# Patient Record
Sex: MALE | Race: WHITE | NOT HISPANIC OR LATINO
[De-identification: names, ages, dates, MRNs, and addresses within clinical notes are randomized per-mention and may not be internally consistent; named-entity substitution may affect disease eponyms.]

---

## 2018-09-25 PROBLEM — Z00.00 ENCOUNTER FOR PREVENTIVE HEALTH EXAMINATION: Status: ACTIVE | Noted: 2018-09-25

## 2018-10-01 ENCOUNTER — APPOINTMENT (OUTPATIENT)
Dept: PULMONOLOGY | Facility: CLINIC | Age: 83
End: 2018-10-01
Payer: MEDICARE

## 2018-10-01 VITALS
OXYGEN SATURATION: 96 % | SYSTOLIC BLOOD PRESSURE: 122 MMHG | HEART RATE: 66 BPM | HEIGHT: 63.5 IN | WEIGHT: 154 LBS | DIASTOLIC BLOOD PRESSURE: 78 MMHG | RESPIRATION RATE: 16 BRPM | BODY MASS INDEX: 26.95 KG/M2

## 2018-10-01 DIAGNOSIS — Z87.438 PERSONAL HISTORY OF OTHER DISEASES OF MALE GENITAL ORGANS: ICD-10-CM

## 2018-10-01 DIAGNOSIS — Z87.891 PERSONAL HISTORY OF NICOTINE DEPENDENCE: ICD-10-CM

## 2018-10-01 DIAGNOSIS — Z86.19 PERSONAL HISTORY OF OTHER INFECTIOUS AND PARASITIC DISEASES: ICD-10-CM

## 2018-10-01 DIAGNOSIS — Z87.448 PERSONAL HISTORY OF OTHER DISEASES OF URINARY SYSTEM: ICD-10-CM

## 2018-10-01 DIAGNOSIS — R06.09 OTHER FORMS OF DYSPNEA: ICD-10-CM

## 2018-10-01 DIAGNOSIS — R93.89 ABNORMAL FINDINGS ON DIAGNOSTIC IMAGING OF OTHER SPECIFIED BODY STRUCTURES: ICD-10-CM

## 2018-10-01 PROCEDURE — 99204 OFFICE O/P NEW MOD 45 MIN: CPT

## 2018-10-01 RX ORDER — PREGABALIN 100 MG/1
100 CAPSULE ORAL
Refills: 0 | Status: ACTIVE | COMMUNITY

## 2018-10-01 RX ORDER — METOPROLOL SUCCINATE 25 MG/1
25 TABLET, EXTENDED RELEASE ORAL
Refills: 0 | Status: ACTIVE | COMMUNITY

## 2018-10-01 RX ORDER — TAMSULOSIN HYDROCHLORIDE 0.4 MG/1
0.4 CAPSULE ORAL
Refills: 0 | Status: ACTIVE | COMMUNITY

## 2018-10-01 RX ORDER — HYDROCHLOROTHIAZIDE 12.5 MG/1
12.5 TABLET ORAL
Refills: 0 | Status: ACTIVE | COMMUNITY

## 2018-10-05 PROBLEM — R93.89 ABNORMAL COMPUTERIZED AXIAL TOMOGRAPHY OF CHEST: Status: ACTIVE | Noted: 2018-10-05

## 2019-06-04 ENCOUNTER — APPOINTMENT (OUTPATIENT)
Dept: DERMATOLOGY | Facility: CLINIC | Age: 84
End: 2019-06-04
Payer: MEDICARE

## 2019-06-04 PROCEDURE — 99202 OFFICE O/P NEW SF 15 MIN: CPT

## 2019-07-02 ENCOUNTER — APPOINTMENT (OUTPATIENT)
Dept: DERMATOLOGY | Facility: CLINIC | Age: 84
End: 2019-07-02

## 2019-12-05 ENCOUNTER — APPOINTMENT (OUTPATIENT)
Dept: DERMATOLOGY | Facility: CLINIC | Age: 84
End: 2019-12-05
Payer: MEDICARE

## 2019-12-05 PROCEDURE — 99215 OFFICE O/P EST HI 40 MIN: CPT

## 2020-02-10 ENCOUNTER — APPOINTMENT (OUTPATIENT)
Dept: OTOLARYNGOLOGY | Facility: CLINIC | Age: 85
End: 2020-02-10

## 2022-12-01 ENCOUNTER — APPOINTMENT (OUTPATIENT)
Dept: DERMATOLOGY | Facility: CLINIC | Age: 87
End: 2022-12-01

## 2023-05-18 ENCOUNTER — APPOINTMENT (OUTPATIENT)
Dept: DERMATOLOGY | Facility: CLINIC | Age: 88
End: 2023-05-18
Payer: MEDICARE

## 2023-05-18 PROCEDURE — 99204 OFFICE O/P NEW MOD 45 MIN: CPT

## 2023-05-22 ENCOUNTER — APPOINTMENT (OUTPATIENT)
Dept: DERMATOLOGY | Facility: CLINIC | Age: 88
End: 2023-05-22

## 2023-07-05 ENCOUNTER — OFFICE (OUTPATIENT)
Dept: URBAN - METROPOLITAN AREA CLINIC 113 | Facility: CLINIC | Age: 88
Setting detail: OPHTHALMOLOGY
End: 2023-07-05
Payer: MEDICARE

## 2023-07-05 DIAGNOSIS — H04.123: ICD-10-CM

## 2023-07-05 DIAGNOSIS — H01.011: ICD-10-CM

## 2023-07-05 DIAGNOSIS — H04.121: ICD-10-CM

## 2023-07-05 DIAGNOSIS — H01.014: ICD-10-CM

## 2023-07-05 DIAGNOSIS — H35.373: ICD-10-CM

## 2023-07-05 DIAGNOSIS — H35.033: ICD-10-CM

## 2023-07-05 DIAGNOSIS — H02.132: ICD-10-CM

## 2023-07-05 DIAGNOSIS — H02.135: ICD-10-CM

## 2023-07-05 DIAGNOSIS — H04.122: ICD-10-CM

## 2023-07-05 PROBLEM — Z96.1 PSEUDOPHAKIA: Status: ACTIVE | Noted: 2023-07-05

## 2023-07-05 PROCEDURE — 92014 COMPRE OPH EXAM EST PT 1/>: CPT | Performed by: OPHTHALMOLOGY

## 2023-07-05 PROCEDURE — 92134 CPTRZ OPH DX IMG PST SGM RTA: CPT | Performed by: OPHTHALMOLOGY

## 2023-07-05 PROCEDURE — 83861 MICROFLUID ANALY TEARS: CPT | Performed by: OPHTHALMOLOGY

## 2023-07-05 ASSESSMENT — CONFRONTATIONAL VISUAL FIELD TEST (CVF)
OS_FINDINGS: FULL
OD_FINDINGS: FULL

## 2023-07-05 ASSESSMENT — LID POSITION - ECTROPION
OD_ECTROPION: RLL 2+
OS_ECTROPION: LLL 2+

## 2023-07-05 ASSESSMENT — REFRACTION_AUTOREFRACTION
OS_CYLINDER: -1.25
OD_SPHERE: +0.50
OD_AXIS: 100
OS_AXIS: 055
OS_SPHERE: +0.25
OD_CYLINDER: -1.75

## 2023-07-05 ASSESSMENT — KERATOMETRY
OD_K1POWER_DIOPTERS: 42.25
OS_K2POWER_DIOPTERS: 43.25
OS_K1POWER_DIOPTERS: 42.25
OD_K2POWER_DIOPTERS: 43.50
OS_AXISANGLE_DEGREES: 112
OD_AXISANGLE_DEGREES: 006

## 2023-07-05 ASSESSMENT — VISUAL ACUITY
OS_BCVA: 20/30
OD_BCVA: 20/20

## 2023-07-05 ASSESSMENT — SPHEQUIV_DERIVED
OD_SPHEQUIV: -0.375
OS_SPHEQUIV: -0.375

## 2023-07-05 ASSESSMENT — TONOMETRY: OS_IOP_MMHG: 11

## 2023-07-05 ASSESSMENT — PUNCTA - ASSESSMENT
OS_PUNCTA: SMALL
OD_PUNCTA: ABSENT

## 2023-07-05 ASSESSMENT — LID EXAM ASSESSMENTS
OS_BLEPHARITIS: 1+
OS_COMMENTS: MILD FLAKING
OD_BLEPHARITIS: 1+
OD_COMMENTS: MILD FLAKING

## 2023-07-05 ASSESSMENT — AXIALLENGTH_DERIVED
OD_AL: 23.9737
OS_AL: 24.0212

## 2024-05-23 ENCOUNTER — INPATIENT (INPATIENT)
Facility: HOSPITAL | Age: 89
LOS: 3 days | Discharge: HOME HEALTH | DRG: 871 | End: 2024-05-27
Attending: GENERAL ACUTE CARE HOSPITAL | Admitting: HOSPITALIST
Payer: MEDICARE

## 2024-05-23 VITALS
WEIGHT: 141.98 LBS | TEMPERATURE: 103 F | OXYGEN SATURATION: 96 % | DIASTOLIC BLOOD PRESSURE: 81 MMHG | RESPIRATION RATE: 20 BRPM | HEART RATE: 98 BPM | SYSTOLIC BLOOD PRESSURE: 169 MMHG

## 2024-05-23 LAB
ALBUMIN SERPL ELPH-MCNC: 4.1 G/DL — SIGNIFICANT CHANGE UP (ref 3.3–5.2)
ALP SERPL-CCNC: 83 U/L — SIGNIFICANT CHANGE UP (ref 40–120)
ALT FLD-CCNC: 17 U/L — SIGNIFICANT CHANGE UP
ANION GAP SERPL CALC-SCNC: 14 MMOL/L — SIGNIFICANT CHANGE UP (ref 5–17)
ANISOCYTOSIS BLD QL: SIGNIFICANT CHANGE UP
APPEARANCE UR: CLEAR — SIGNIFICANT CHANGE UP
APTT BLD: 26.5 SEC — SIGNIFICANT CHANGE UP (ref 24.5–35.6)
AST SERPL-CCNC: 25 U/L — SIGNIFICANT CHANGE UP
BACTERIA # UR AUTO: NEGATIVE /HPF — SIGNIFICANT CHANGE UP
BASOPHILS # BLD AUTO: 0 K/UL — SIGNIFICANT CHANGE UP (ref 0–0.2)
BASOPHILS NFR BLD AUTO: 0 % — SIGNIFICANT CHANGE UP (ref 0–2)
BILIRUB SERPL-MCNC: 2.1 MG/DL — HIGH (ref 0.4–2)
BILIRUB UR-MCNC: NEGATIVE — SIGNIFICANT CHANGE UP
BUN SERPL-MCNC: 35.1 MG/DL — HIGH (ref 8–20)
BURR CELLS BLD QL SMEAR: PRESENT — SIGNIFICANT CHANGE UP
CALCIUM SERPL-MCNC: 9.2 MG/DL — SIGNIFICANT CHANGE UP (ref 8.4–10.5)
CAST: 4 /LPF — SIGNIFICANT CHANGE UP (ref 0–4)
CHLORIDE SERPL-SCNC: 97 MMOL/L — SIGNIFICANT CHANGE UP (ref 96–108)
CO2 SERPL-SCNC: 23 MMOL/L — SIGNIFICANT CHANGE UP (ref 22–29)
COLOR SPEC: YELLOW — SIGNIFICANT CHANGE UP
CREAT SERPL-MCNC: 1.28 MG/DL — SIGNIFICANT CHANGE UP (ref 0.5–1.3)
DIFF PNL FLD: ABNORMAL
EGFR: 52 ML/MIN/1.73M2 — LOW
EOSINOPHIL # BLD AUTO: 0 K/UL — SIGNIFICANT CHANGE UP (ref 0–0.5)
EOSINOPHIL NFR BLD AUTO: 0 % — SIGNIFICANT CHANGE UP (ref 0–6)
FLUAV AG NPH QL: SIGNIFICANT CHANGE UP
FLUBV AG NPH QL: SIGNIFICANT CHANGE UP
GLUCOSE SERPL-MCNC: 132 MG/DL — HIGH (ref 70–99)
GLUCOSE UR QL: NEGATIVE MG/DL — SIGNIFICANT CHANGE UP
HCT VFR BLD CALC: 38.7 % — LOW (ref 39–50)
HGB BLD-MCNC: 13.1 G/DL — SIGNIFICANT CHANGE UP (ref 13–17)
INR BLD: 1.04 RATIO — SIGNIFICANT CHANGE UP (ref 0.85–1.18)
KETONES UR-MCNC: NEGATIVE MG/DL — SIGNIFICANT CHANGE UP
LACTATE BLDV-MCNC: 1.5 MMOL/L — SIGNIFICANT CHANGE UP (ref 0.5–2)
LEUKOCYTE ESTERASE UR-ACNC: NEGATIVE — SIGNIFICANT CHANGE UP
LYMPHOCYTES # BLD AUTO: 0.37 K/UL — LOW (ref 1–3.3)
LYMPHOCYTES # BLD AUTO: 5.2 % — LOW (ref 13–44)
MANUAL SMEAR VERIFICATION: SIGNIFICANT CHANGE UP
MCHC RBC-ENTMCNC: 29.8 PG — SIGNIFICANT CHANGE UP (ref 27–34)
MCHC RBC-ENTMCNC: 33.9 GM/DL — SIGNIFICANT CHANGE UP (ref 32–36)
MCV RBC AUTO: 88 FL — SIGNIFICANT CHANGE UP (ref 80–100)
MICROCYTES BLD QL: SIGNIFICANT CHANGE UP
MONOCYTES # BLD AUTO: 0.43 K/UL — SIGNIFICANT CHANGE UP (ref 0–0.9)
MONOCYTES NFR BLD AUTO: 6.1 % — SIGNIFICANT CHANGE UP (ref 2–14)
NEUTROPHILS # BLD AUTO: 6.27 K/UL — SIGNIFICANT CHANGE UP (ref 1.8–7.4)
NEUTROPHILS NFR BLD AUTO: 88.7 % — HIGH (ref 43–77)
NITRITE UR-MCNC: NEGATIVE — SIGNIFICANT CHANGE UP
PH UR: 6 — SIGNIFICANT CHANGE UP (ref 5–8)
PLAT MORPH BLD: NORMAL — SIGNIFICANT CHANGE UP
PLATELET # BLD AUTO: 115 K/UL — LOW (ref 150–400)
POIKILOCYTOSIS BLD QL AUTO: SLIGHT — SIGNIFICANT CHANGE UP
POLYCHROMASIA BLD QL SMEAR: SLIGHT — SIGNIFICANT CHANGE UP
POTASSIUM SERPL-MCNC: 4.5 MMOL/L — SIGNIFICANT CHANGE UP (ref 3.5–5.3)
POTASSIUM SERPL-SCNC: 4.5 MMOL/L — SIGNIFICANT CHANGE UP (ref 3.5–5.3)
PROT SERPL-MCNC: 6.9 G/DL — SIGNIFICANT CHANGE UP (ref 6.6–8.7)
PROT UR-MCNC: 100 MG/DL
PROTHROM AB SERPL-ACNC: 11.5 SEC — SIGNIFICANT CHANGE UP (ref 9.5–13)
RBC # BLD: 4.4 M/UL — SIGNIFICANT CHANGE UP (ref 4.2–5.8)
RBC # FLD: 14.2 % — SIGNIFICANT CHANGE UP (ref 10.3–14.5)
RBC BLD AUTO: ABNORMAL
RBC CASTS # UR COMP ASSIST: 18 /HPF — HIGH (ref 0–4)
RSV RNA NPH QL NAA+NON-PROBE: SIGNIFICANT CHANGE UP
SARS-COV-2 RNA SPEC QL NAA+PROBE: SIGNIFICANT CHANGE UP
SODIUM SERPL-SCNC: 134 MMOL/L — LOW (ref 135–145)
SP GR SPEC: 1.02 — SIGNIFICANT CHANGE UP (ref 1–1.03)
SQUAMOUS # UR AUTO: 1 /HPF — SIGNIFICANT CHANGE UP (ref 0–5)
TROPONIN T, HIGH SENSITIVITY RESULT: 39 NG/L — SIGNIFICANT CHANGE UP (ref 0–51)
UROBILINOGEN FLD QL: 1 MG/DL — SIGNIFICANT CHANGE UP (ref 0.2–1)
WBC # BLD: 7.07 K/UL — SIGNIFICANT CHANGE UP (ref 3.8–10.5)
WBC # FLD AUTO: 7.07 K/UL — SIGNIFICANT CHANGE UP (ref 3.8–10.5)
WBC UR QL: 1 /HPF — SIGNIFICANT CHANGE UP (ref 0–5)

## 2024-05-23 PROCEDURE — 99285 EMERGENCY DEPT VISIT HI MDM: CPT

## 2024-05-23 PROCEDURE — 71250 CT THORAX DX C-: CPT | Mod: 26,MC

## 2024-05-23 PROCEDURE — 99497 ADVNCD CARE PLAN 30 MIN: CPT | Mod: 25

## 2024-05-23 PROCEDURE — 71045 X-RAY EXAM CHEST 1 VIEW: CPT | Mod: 26

## 2024-05-23 PROCEDURE — 74176 CT ABD & PELVIS W/O CONTRAST: CPT | Mod: 26,MC

## 2024-05-23 PROCEDURE — 99223 1ST HOSP IP/OBS HIGH 75: CPT

## 2024-05-23 PROCEDURE — 93010 ELECTROCARDIOGRAM REPORT: CPT

## 2024-05-23 RX ORDER — VANCOMYCIN HCL 1 G
1000 VIAL (EA) INTRAVENOUS ONCE
Refills: 0 | Status: COMPLETED | OUTPATIENT
Start: 2024-05-23 | End: 2024-05-23

## 2024-05-23 RX ORDER — SODIUM CHLORIDE 9 MG/ML
2000 INJECTION, SOLUTION INTRAVENOUS ONCE
Refills: 0 | Status: COMPLETED | OUTPATIENT
Start: 2024-05-23 | End: 2024-05-23

## 2024-05-23 RX ORDER — CEFEPIME 1 G/1
2000 INJECTION, POWDER, FOR SOLUTION INTRAMUSCULAR; INTRAVENOUS ONCE
Refills: 0 | Status: DISCONTINUED | OUTPATIENT
Start: 2024-05-23 | End: 2024-05-23

## 2024-05-23 RX ORDER — CEFEPIME 1 G/1
2000 INJECTION, POWDER, FOR SOLUTION INTRAMUSCULAR; INTRAVENOUS ONCE
Refills: 0 | Status: COMPLETED | OUTPATIENT
Start: 2024-05-23 | End: 2024-05-23

## 2024-05-23 RX ORDER — AZITHROMYCIN 500 MG/1
500 TABLET, FILM COATED ORAL ONCE
Refills: 0 | Status: COMPLETED | OUTPATIENT
Start: 2024-05-23 | End: 2024-05-23

## 2024-05-23 RX ORDER — ACETAMINOPHEN 500 MG
1000 TABLET ORAL ONCE
Refills: 0 | Status: COMPLETED | OUTPATIENT
Start: 2024-05-23 | End: 2024-05-23

## 2024-05-23 RX ADMIN — Medication 250 MILLIGRAM(S): at 19:00

## 2024-05-23 RX ADMIN — SODIUM CHLORIDE 2000 MILLILITER(S): 9 INJECTION, SOLUTION INTRAVENOUS at 18:53

## 2024-05-23 RX ADMIN — CEFEPIME 2000 MILLIGRAM(S): 1 INJECTION, POWDER, FOR SOLUTION INTRAMUSCULAR; INTRAVENOUS at 18:53

## 2024-05-23 RX ADMIN — Medication 400 MILLIGRAM(S): at 18:53

## 2024-05-23 NOTE — ED ADULT TRIAGE NOTE - CHIEF COMPLAINT QUOTE
Pt BIBA c/o fever.  Only other physical complaint is jaw discomfort from prior surgery to remove cancer. Pt on 4LNC from EMS.

## 2024-05-23 NOTE — ED PROVIDER NOTE - PHYSICAL EXAMINATION
General: NAD  HEENT: Normocephalic, atraumatic  Neck: No apparent stiffness or JVD  Pulm: Chest wall symmetric and nontender, lungs clear to ascultation   Cardiac: Regular rate and regular rhythm  Abdomen: Nontender and nondistended  Skin: Skin is warm, dry and intact without rashes or lesions.  Neuro: No motor or sensory deficits, CN 2-12 intact, PERRLA, EOMI, moving extremities equally  MSK: No deformity or tenderness  Psych: calm, cooperative General: NAD  HEENT: Normocephalic, atraumatic  Neck: No apparent stiffness or JVD  Pulm: Chest wall symmetric and nontender, lungs clear to ascultation, hypoxic on room air  Cardiac: tachycardic rate and regular rhythm  Abdomen: Nontender and nondistended  Skin: Skin is warm, dry and intact without rashes or lesions.  Neuro: No motor or sensory deficits, CN 2-12 intact, PERRLA, EOMI, moving extremities equally  MSK: No deformity or tenderness  Psych: calm, cooperative

## 2024-05-23 NOTE — ED ADULT NURSE REASSESSMENT NOTE - NS ED NURSE REASSESS COMMENT FT1
patient resting on stretcher quietly and on O2 via nc at 2l/min, NSR on cardiac monitor and on iv fluids/antibiotic, no acute distress noted and family at bedside.

## 2024-05-23 NOTE — ED PROVIDER NOTE - OBJECTIVE STATEMENT
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 Patient presents with daughter and denies SOB, cough, chills, burning on urination, diarrhea, constipation, N/V, chest pain, palpitations. Patient endorses right sided jaw pain that is chronic. Daughter reports she has noticed urinary frequency and that he has been coughing more lately. She reports he does not want to be at the hospital and will deny having any symptoms.

## 2024-05-23 NOTE — ED ADULT NURSE NOTE - NSFALLRISKINTERV_ED_ALL_ED

## 2024-05-23 NOTE — ED PROVIDER NOTE - CARE PLAN
Principal Discharge DX:	Pneumonia  Secondary Diagnosis:	Acute respiratory failure with hypoxia  Secondary Diagnosis:	Sepsis   1

## 2024-05-23 NOTE — ED PROVIDER NOTE - CLINICAL SUMMARY MEDICAL DECISION MAKING FREE TEXT BOX
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    Patient febrile to 103.1F and tachycardic. Blood cx, CBC, CMP, UA, Flu with COVID-19 ordered, CXR. Will treat sepsis empirically with cefepime and vanc. IVF. Ofirmev for fever. 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    Patient febrile to 103.1F and tachycardic. Blood cx, CBC, CMP, UA, Flu with COVID-19 ordered, CXR. Will treat sepsis empirically with cefepime and vanc. IVF. Ofirmev for fever.    Patient with hematuria on UA. CXR unremarkable. 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    Patient febrile to 103.1F and tachycardic. Blood cx, CBC, CMP, UA, Flu with COVID-19 ordered, CXR. Will treat sepsis empirically with cefepime and vanc. IVF. Ofirmev for fever.    Patient with hematuria on UA. Upon ambulation, patient desatted to 87%. Placed on nasal cannula. CXR unremarkable. CT chest, abdomen, pelvis ordered

## 2024-05-23 NOTE — ED ADULT NURSE NOTE - OBJECTIVE STATEMENT
Pt is AxOx3, c/o chest pain, fever and chills. Upon assessment pt is febrile, and is on2L nc which he normally isnt on O2. Pt placed in gown. Cardiac monitor and  inplace. Pt is aware of plan of care.

## 2024-05-23 NOTE — ED PROVIDER NOTE - ATTENDING CONTRIBUTION TO CARE
I, Wil Lucero MD, personally saw the patient with the resident, and completed the key components of the history and physical exam. I then discussed the management plan with the resident.  Patient with a history of BPH on Flomax, pacemaker in place, previous bone cancer of the right jaw status post resection is presenting with fever and fatigue.  Per daughter and patient, for the past few days, he has been more fatigued compared to his baseline.  Not active as usual.  Patient has been having cough but denies any shortness of breath or chest pain.  Has been having polyuria but no reported dysuria.  On exam patient is febrile and tachycardic.  While standing up in the room to use the bathroom, he did desaturate to 88% on room air.  Placed on 2 L nasal cannula with improvement.  No abdominal pain noted.  No focal breath sounds heard.  Concern for pneumonia given fever, cough and hypoxia.  Will evaluate for UTI as well given his polyuria.  Possible viral pathology such as flu versus COVID.  Plan on lab work, imaging, nasal cannula oxygen, anticipate admission.

## 2024-05-23 NOTE — ED PROVIDER NOTE - PROGRESS NOTE DETAILS
Patient with hematuria on UA. CXR unremarkable. CT chest, abdomen, pelvis ordered Upon ambulation, patient desatted to 87%. Patient with hematuria on UA. CXR unremarkable. CT chest, abdomen, pelvis ordered Patient with concerning findings of pneumonia on CT scan.  No renal stone noted.  Still on 2 L nasal cannula.  Resting comfortably at this time.  Spoke with Dr. Munoz for admission.  Updated family at bedside.  Wil Lucero MD.

## 2024-05-24 DIAGNOSIS — Z95.0 PRESENCE OF CARDIAC PACEMAKER: Chronic | ICD-10-CM

## 2024-05-24 DIAGNOSIS — J18.9 PNEUMONIA, UNSPECIFIED ORGANISM: ICD-10-CM

## 2024-05-24 LAB
ALBUMIN SERPL ELPH-MCNC: 3.7 G/DL — SIGNIFICANT CHANGE UP (ref 3.3–5.2)
ALP SERPL-CCNC: 90 U/L — SIGNIFICANT CHANGE UP (ref 40–120)
ALT FLD-CCNC: 31 U/L — SIGNIFICANT CHANGE UP
ANION GAP SERPL CALC-SCNC: 14 MMOL/L — SIGNIFICANT CHANGE UP (ref 5–17)
AST SERPL-CCNC: 40 U/L — HIGH
BASOPHILS # BLD AUTO: 0.01 K/UL — SIGNIFICANT CHANGE UP (ref 0–0.2)
BASOPHILS NFR BLD AUTO: 0.1 % — SIGNIFICANT CHANGE UP (ref 0–2)
BILIRUB DIRECT SERPL-MCNC: 0.5 MG/DL — HIGH (ref 0–0.3)
BILIRUB SERPL-MCNC: 3.7 MG/DL — HIGH (ref 0.4–2)
BUN SERPL-MCNC: 26.2 MG/DL — HIGH (ref 8–20)
CALCIUM SERPL-MCNC: 8.8 MG/DL — SIGNIFICANT CHANGE UP (ref 8.4–10.5)
CHLORIDE SERPL-SCNC: 98 MMOL/L — SIGNIFICANT CHANGE UP (ref 96–108)
CO2 SERPL-SCNC: 24 MMOL/L — SIGNIFICANT CHANGE UP (ref 22–29)
CREAT SERPL-MCNC: 1.11 MG/DL — SIGNIFICANT CHANGE UP (ref 0.5–1.3)
CULTURE RESULTS: SIGNIFICANT CHANGE UP
EGFR: 61 ML/MIN/1.73M2 — SIGNIFICANT CHANGE UP
EOSINOPHIL # BLD AUTO: 0.05 K/UL — SIGNIFICANT CHANGE UP (ref 0–0.5)
EOSINOPHIL NFR BLD AUTO: 0.5 % — SIGNIFICANT CHANGE UP (ref 0–6)
GLUCOSE SERPL-MCNC: 113 MG/DL — HIGH (ref 70–99)
HCT VFR BLD CALC: 37.8 % — LOW (ref 39–50)
HGB BLD-MCNC: 12.7 G/DL — LOW (ref 13–17)
IMM GRANULOCYTES NFR BLD AUTO: 1.1 % — HIGH (ref 0–0.9)
LYMPHOCYTES # BLD AUTO: 1.11 K/UL — SIGNIFICANT CHANGE UP (ref 1–3.3)
LYMPHOCYTES # BLD AUTO: 10.2 % — LOW (ref 13–44)
MCHC RBC-ENTMCNC: 29.6 PG — SIGNIFICANT CHANGE UP (ref 27–34)
MCHC RBC-ENTMCNC: 33.6 GM/DL — SIGNIFICANT CHANGE UP (ref 32–36)
MCV RBC AUTO: 88.1 FL — SIGNIFICANT CHANGE UP (ref 80–100)
MONOCYTES # BLD AUTO: 0.67 K/UL — SIGNIFICANT CHANGE UP (ref 0–0.9)
MONOCYTES NFR BLD AUTO: 6.2 % — SIGNIFICANT CHANGE UP (ref 2–14)
MRSA PCR RESULT.: SIGNIFICANT CHANGE UP
NEUTROPHILS # BLD AUTO: 8.9 K/UL — HIGH (ref 1.8–7.4)
NEUTROPHILS NFR BLD AUTO: 81.9 % — HIGH (ref 43–77)
PLATELET # BLD AUTO: 115 K/UL — LOW (ref 150–400)
POTASSIUM SERPL-MCNC: 4.2 MMOL/L — SIGNIFICANT CHANGE UP (ref 3.5–5.3)
POTASSIUM SERPL-SCNC: 4.2 MMOL/L — SIGNIFICANT CHANGE UP (ref 3.5–5.3)
PROT SERPL-MCNC: 6.2 G/DL — LOW (ref 6.6–8.7)
RBC # BLD: 4.29 M/UL — SIGNIFICANT CHANGE UP (ref 4.2–5.8)
RBC # FLD: 14.2 % — SIGNIFICANT CHANGE UP (ref 10.3–14.5)
S AUREUS DNA NOSE QL NAA+PROBE: SIGNIFICANT CHANGE UP
SODIUM SERPL-SCNC: 136 MMOL/L — SIGNIFICANT CHANGE UP (ref 135–145)
SPECIMEN SOURCE: SIGNIFICANT CHANGE UP
WBC # BLD: 10.86 K/UL — HIGH (ref 3.8–10.5)
WBC # FLD AUTO: 10.86 K/UL — HIGH (ref 3.8–10.5)

## 2024-05-24 RX ORDER — PIPERACILLIN AND TAZOBACTAM 4; .5 G/20ML; G/20ML
3.38 INJECTION, POWDER, LYOPHILIZED, FOR SOLUTION INTRAVENOUS EVERY 8 HOURS
Refills: 0 | Status: DISCONTINUED | OUTPATIENT
Start: 2024-05-24 | End: 2024-05-27

## 2024-05-24 RX ORDER — LEVOTHYROXINE SODIUM 125 MCG
37.5 TABLET ORAL AT BEDTIME
Refills: 0 | Status: DISCONTINUED | OUTPATIENT
Start: 2024-05-24 | End: 2024-05-27

## 2024-05-24 RX ORDER — GLUCAGON INJECTION, SOLUTION 0.5 MG/.1ML
1 INJECTION, SOLUTION SUBCUTANEOUS ONCE
Refills: 0 | Status: DISCONTINUED | OUTPATIENT
Start: 2024-05-24 | End: 2024-05-27

## 2024-05-24 RX ORDER — ENOXAPARIN SODIUM 100 MG/ML
40 INJECTION SUBCUTANEOUS EVERY 24 HOURS
Refills: 0 | Status: DISCONTINUED | OUTPATIENT
Start: 2024-05-24 | End: 2024-05-27

## 2024-05-24 RX ORDER — SODIUM CHLORIDE 9 MG/ML
1000 INJECTION INTRAMUSCULAR; INTRAVENOUS; SUBCUTANEOUS
Refills: 0 | Status: DISCONTINUED | OUTPATIENT
Start: 2024-05-24 | End: 2024-05-25

## 2024-05-24 RX ORDER — DEXTROSE 50 % IN WATER 50 %
25 SYRINGE (ML) INTRAVENOUS ONCE
Refills: 0 | Status: DISCONTINUED | OUTPATIENT
Start: 2024-05-24 | End: 2024-05-27

## 2024-05-24 RX ORDER — LEVOTHYROXINE SODIUM 125 MCG
1 TABLET ORAL
Refills: 0 | DISCHARGE

## 2024-05-24 RX ORDER — ONDANSETRON 8 MG/1
4 TABLET, FILM COATED ORAL EVERY 6 HOURS
Refills: 0 | Status: DISCONTINUED | OUTPATIENT
Start: 2024-05-24 | End: 2024-05-27

## 2024-05-24 RX ORDER — ACETAMINOPHEN 500 MG
650 TABLET ORAL EVERY 6 HOURS
Refills: 0 | Status: DISCONTINUED | OUTPATIENT
Start: 2024-05-24 | End: 2024-05-27

## 2024-05-24 RX ORDER — DEXTROSE 50 % IN WATER 50 %
12.5 SYRINGE (ML) INTRAVENOUS ONCE
Refills: 0 | Status: DISCONTINUED | OUTPATIENT
Start: 2024-05-24 | End: 2024-05-27

## 2024-05-24 RX ORDER — DEXTROSE 50 % IN WATER 50 %
15 SYRINGE (ML) INTRAVENOUS ONCE
Refills: 0 | Status: DISCONTINUED | OUTPATIENT
Start: 2024-05-24 | End: 2024-05-27

## 2024-05-24 RX ORDER — TAMSULOSIN HYDROCHLORIDE 0.4 MG/1
1 CAPSULE ORAL
Refills: 0 | DISCHARGE

## 2024-05-24 RX ADMIN — ENOXAPARIN SODIUM 40 MILLIGRAM(S): 100 INJECTION SUBCUTANEOUS at 06:00

## 2024-05-24 RX ADMIN — PIPERACILLIN AND TAZOBACTAM 25 GRAM(S): 4; .5 INJECTION, POWDER, LYOPHILIZED, FOR SOLUTION INTRAVENOUS at 13:22

## 2024-05-24 RX ADMIN — PIPERACILLIN AND TAZOBACTAM 25 GRAM(S): 4; .5 INJECTION, POWDER, LYOPHILIZED, FOR SOLUTION INTRAVENOUS at 05:59

## 2024-05-24 RX ADMIN — SODIUM CHLORIDE 50 MILLILITER(S): 9 INJECTION INTRAMUSCULAR; INTRAVENOUS; SUBCUTANEOUS at 18:35

## 2024-05-24 RX ADMIN — AZITHROMYCIN 255 MILLIGRAM(S): 500 TABLET, FILM COATED ORAL at 00:40

## 2024-05-24 RX ADMIN — PIPERACILLIN AND TAZOBACTAM 25 GRAM(S): 4; .5 INJECTION, POWDER, LYOPHILIZED, FOR SOLUTION INTRAVENOUS at 21:52

## 2024-05-24 RX ADMIN — Medication 37.5 MICROGRAM(S): at 21:52

## 2024-05-24 NOTE — SWALLOW BEDSIDE ASSESSMENT ADULT - SLP PERTINENT HISTORY OF CURRENT PROBLEM
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 Patient admitted for acute hypoxemic respiratory failure 2/2 LLL PNA. Currently on 2L NC and IV antibiotics. Cultures pending. SLP consulted".

## 2024-05-24 NOTE — SWALLOW BEDSIDE ASSESSMENT ADULT - DIET PRIOR TO ADMI
Discussed hx with Pt. R mandibular SCC in 2019, s/p resection & reconstruction @ [facility], no chemo or XRT, in remission since. Consumes regular solids w/thin liquids at home, without subjective difficulty. Has not recd dysphagia eval or treatment in past, no feeding tube needed after sx in 2019.

## 2024-05-24 NOTE — SWALLOW BEDSIDE ASSESSMENT ADULT - COMMENTS
Ct chest: "Prominent fibrotic change and consolidation in the left lower lobe. Superimposed acute infection upon chronic process is possible. Clinical   and laboratory correlation and follow up suggested. Secretions/debris within the trachea. Aspiration precautions suggested".

## 2024-05-24 NOTE — PATIENT PROFILE ADULT - FALL HARM RISK - HARM RISK INTERVENTIONS

## 2024-05-24 NOTE — PHYSICAL THERAPY INITIAL EVALUATION ADULT - PERTINENT HX OF CURRENT PROBLEM, REHAB EVAL
pt admitted 5/23/24, BIBA due to fever and decreased oxygen saturation. pt with hx mandibular bone CA with L jaw discomfort. +CT chest with L lower lobe consolidations. Pt work up for PNA, acute respiratory failure and sepsis.

## 2024-05-24 NOTE — H&P ADULT - ASSESSMENT
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), now in remission, presenting with fever, malaise and cough, on admission found to be febrile, mild tachycardic and hypoxic with CT chest concerning for PNA    Acute hypoxemic respiratory failure with sepsis due to bacterial pneumonia  -CT chest w/ LLL consolidation, secretions w/in trachea concerning for aspiration  -s/p cefepime, vanco, azithro by ED, will change to Zosyn  -Obtain MRSA PCR, sputum cx, urine Strep and Legionella   -Passed bedside dysphagia but given above CT findings, NPO until formal swallow eval  -Blood, urine cx pending  -s/p 2L given by ED, lactate WNL  -On 2L NC, wean as tolerated    Elevated LFTs  -Mild hyperbilirubinemia, suspect from sepsis   -Normal hepatobiliary system on CT A/P    Hx BPH  -Tamsulosin on hold while NPO    Hx hypothyroidism  -IV levothyroxine while NPO    Prophylactic measure  -Lovenox 40mg daily    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), now in remission, presenting with fever, malaise and cough, on admission found to be febrile, mild tachycardic and hypoxic with CT chest concerning for PNA    Acute hypoxemic respiratory failure with sepsis due to bacterial pneumonia  -CT chest w/ LLL consolidation, secretions w/in trachea concerning for aspiration  -s/p cefepime, vanco, azithro by ED, will change to Zosyn  -Obtain MRSA PCR, sputum cx, urine Strep and Legionella   -Passed bedside dysphagia but given above CT findings, NPO until formal swallow eval  -Blood, urine cx pending  -s/p 2L given by ED, lactate WNL  -On 2L NC, wean as tolerated    Elevated LFTs  -Mild hyperbilirubinemia, suspect from sepsis   -Normal hepatobiliary system on CT A/P    Thrombocytopenia  -Mild, no prior for comparison  -Likely related to infectious process, monitor    Hx BPH  -Tamsulosin on hold while NPO    Hx hypothyroidism  -IV levothyroxine while NPO    Prophylactic measure  -Lovenox 40mg daily

## 2024-05-24 NOTE — ED ADULT NURSE REASSESSMENT NOTE - NS ED NURSE REASSESS COMMENT FT1
patient with urinary frequency, patient able to use urinal at bedside with minimal assistance, safety precautions in place and call bell at reach.

## 2024-05-24 NOTE — H&P ADULT - TIME BILLING
chart review, patient encounter, chart documentation.  Plan discussed with patient and daughter at bedside.

## 2024-05-24 NOTE — H&P ADULT - HISTORY OF PRESENT ILLNESS
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  Hx obtained primarily from daughter at bedside.  Pt has few days of not feeling well and new onset mildly productive cough.  Pt has  who comes to visit in day  who notified daughter that pt appeared weak and to have labored breathing.  EMS was called who found to be febrile.  Pt eats soft foods due to hx of mandibular cancer with jaw surgery.  Daughter thinks pt has been tolerating his diet well and has not noticed any association between cough and eating for pt.  No reported GI or  related symptoms. On admission, pt still febrile and also hypoxic w/ O2 in 80s. CT chest w/ LLL consolidation and secretions/debris in trachea concerning for aspiration.  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  Hx obtained primarily from daughter at bedside.  Pt has had few days of not feeling well and new onset cough.  Pt has  who comes to visit in day who notified daughter that pt appeared weak and to have labored breathing.  EMS was called who found to be febrile.  Pt eats soft foods due to hx of mandibular cancer with jaw surgery.  Daughter thinks pt has been tolerating his diet well and has not noticed any association between cough and eating for pt.  No reported GI or  related symptoms. On admission, pt still febrile and also hypoxic w/ O2 in 80s. CT chest w/ LLL consolidation and secretions/debris in trachea concerning for aspiration.

## 2024-05-24 NOTE — SWALLOW BEDSIDE ASSESSMENT ADULT - SWALLOW EVAL: DIAGNOSIS
Oral stage generally functional for trials consumed puree & all liquid viscosities. Cannot exclude pharyngeal dysphagia at this time. Suspect reduced laryngeal elevation as per digital palpation with multiple swallows x 2-3. Variable throat clear noted specifically with thin & mildly thick liquids, as well as puree. No overt s/s penetration or aspiration demonstrated w/moderately thick fluids only.

## 2024-05-24 NOTE — SWALLOW BEDSIDE ASSESSMENT ADULT - SLP GENERAL OBSERVATIONS
Recd awake/upright in bed, A&A Ox3, 0/10 pain pre/post, tolerating O2 via NC NAD, pleasant/cooperative, daughter present for encounter

## 2024-05-24 NOTE — SWALLOW BEDSIDE ASSESSMENT ADULT - PHARYNGEAL PHASE
Decreased laryngeal elevation Decreased laryngeal elevation/Throat clear post oral intake/Delayed throat clear post oral intake

## 2024-05-24 NOTE — H&P ADULT - NSHPLABSRESULTS_GEN_ALL_CORE
05-23    134<L>  |  97  |  35.1<H>  ----------------------------<  132<H>  4.5   |  23.0  |  1.28    Ca    9.2      23 May 2024 17:28    TPro  6.9  /  Alb  4.1  /  TBili  2.1<H>  /  DBili  x   /  AST  25  /  ALT  17  /  AlkPhos  83  05-23                            13.1   7.07  )-----------( 115      ( 23 May 2024 17:28 )             38.7

## 2024-05-24 NOTE — PATIENT PROFILE ADULT - FALL HARM RISK - DEVICES
Airway  Urgency: elective    Date/Time: 10/15/2021 9:32 AM  End Time:10/15/2021 9:32 AM  Airway not difficult    General Information and Staff    Patient location during procedure: OR  CRNA: Maryam Diaz CRNA    Indications and Patient Condition  Indications for airway management: airway protection    Preoxygenated: yes  MILS maintained throughout  Mask difficulty assessment: 0 - not attempted    Final Airway Details  Final airway type: endotracheal airway      Successful airway: ETT  Cuffed: yes   Successful intubation technique: direct laryngoscopy  Facilitating devices/methods: intubating stylet  Endotracheal tube insertion site: oral  Blade: Bethea  Blade size: 2  ETT size (mm): 7.0  Cormack-Lehane Classification: grade I - full view of glottis  Placement verified by: chest auscultation and capnometry   Measured from: lips  ETT/EBT  to lips (cm): 19  Number of attempts at approach: 1  Assessment: lips, teeth, and gum same as pre-op and atraumatic intubation              
Peripheral Block      Patient reassessed immediately prior to procedure    Patient location during procedure: pre-op  Start time: 10/15/2021 9:06 AM  Stop time: 10/15/2021 9:09 AM  Reason for block: at surgeon's request and post-op pain management  Performed by  CRNA: Maryam Diaz CRNA  Preanesthetic Checklist  Completed: patient identified, IV checked, site marked, risks and benefits discussed, surgical consent, monitors and equipment checked, pre-op evaluation and timeout performed  Prep:  Pt Position: sitting  Sterile barriers:cap, gloves, mask and washed/disinfected hands  Prep: ChloraPrep  Patient monitoring: blood pressure monitoring, continuous pulse oximetry and EKG  Procedure  Sedation:yes  Performed under: local infiltration  Guidance:ultrasound guided and landmark technique  ULTRASOUND INTERPRETATION.  Using ultrasound guidance a 21 G gauge needle was placed in close proximity to the brachial plexus nerve, at which point, under ultrasound guidance anesthetic was injected in the area of the nerve and spread of the anesthesia was seen on ultrasound in close proximity thereto.  There were no abnormalities seen on ultrasound; a digital image was taken; and the patient tolerated the procedure with no complications. Images:still images obtained, printed/placed on chart    Laterality:left  Block Type:interscalene  Injection Technique:single-shot  Needle Type:echogenic  Needle Gauge:21 G  Resistance on Injection: none    Medications Used: bupivacaine PF (MARCAINE) injection 0.5%, 15 mL  Med admintered at 10/15/2021 9:09 AM      Post Assessment  Injection Assessment: negative aspiration for heme, no paresthesia on injection and incremental injection  Patient Tolerance:comfortable throughout block  Complications:no  Additional Notes  LIDOCAINE 1% 0.5ML SKIN INFILTRATION            
None

## 2024-05-24 NOTE — PHYSICAL THERAPY INITIAL EVALUATION ADULT - GAIT DISTANCE, PT EVAL
150 feet gait initiated with RW for first 30 ft however not indicated. gait steady and not indicated/150 feet

## 2024-05-24 NOTE — PHYSICAL THERAPY INITIAL EVALUATION ADULT - ADDITIONAL COMMENTS
pt lives alone, daughters local and involved in patients care. Pt has RW but does not use at baseline. Pt in 1 level home with 2STE through garage. +

## 2024-05-24 NOTE — H&P ADULT - CONVERSATION DETAILS
Pt defers GOC to daughter Rachel Shah at bedside.  Per daughter, pt is very functional despite his advanced age; he live alone and still drives by himself.  Daughter amenable tot pt having trial of resuscitative measures and then to stop life support measures if pt was unable to be weaned off life support.  Explained that at pt age, there is less likelihood to wean off ventilator.  Daughter states she will further discuss with her siblings and will let medical team known if any changes in code status.  Pt is full code.

## 2024-05-24 NOTE — H&P ADULT - NSHPPHYSICALEXAM_GEN_ALL_CORE
Vital Signs Last 24 Hrs  T(C): 37 (24 May 2024 00:38), Max: 39.5 (23 May 2024 17:02)  T(F): 98.6 (24 May 2024 00:38), Max: 103.1 (23 May 2024 17:02)  HR: 78 (24 May 2024 00:38) (78 - 98)  BP: 139/70 (24 May 2024 00:38) (135/69 - 169/81)  BP(mean): --  RR: 19 (24 May 2024 00:38) (18 - 20)  SpO2: 97% (24 May 2024 00:38) (96% - 98%)    Parameters below as of 24 May 2024 00:38  Patient On (Oxygen Delivery Method): nasal cannula  O2 Flow (L/min): 2    GENERAL: Tired appearing elderly male, not in acute distress, wearing nasal cannula  EYES:  Clear conjunctiva, extraocular movement intact  ENT: Moist mucous membranes  RESP:  Non-labored breathing pattern, lungs clear to ausculation in anterior fields  CV: Regular rate and rhythm, no murmurs appreciated, no lower extremity edema  GI: Soft, non-tender, non-distended  NEURO: Awake, alert, conversant, able to lift arms and legs against gravity, light touch sensation grossly intact  PSYCH: Calm, cooperative  SKIN: No rash or lesions, warm and dry

## 2024-05-25 LAB
ALBUMIN SERPL ELPH-MCNC: 3.3 G/DL — SIGNIFICANT CHANGE UP (ref 3.3–5.2)
ALP SERPL-CCNC: 88 U/L — SIGNIFICANT CHANGE UP (ref 40–120)
ALT FLD-CCNC: 24 U/L — SIGNIFICANT CHANGE UP
ANION GAP SERPL CALC-SCNC: 15 MMOL/L — SIGNIFICANT CHANGE UP (ref 5–17)
APPEARANCE UR: CLEAR — SIGNIFICANT CHANGE UP
AST SERPL-CCNC: 29 U/L — SIGNIFICANT CHANGE UP
BILIRUB SERPL-MCNC: 2.6 MG/DL — HIGH (ref 0.4–2)
BILIRUB UR-MCNC: NEGATIVE — SIGNIFICANT CHANGE UP
BUN SERPL-MCNC: 20.1 MG/DL — HIGH (ref 8–20)
CALCIUM SERPL-MCNC: 8.6 MG/DL — SIGNIFICANT CHANGE UP (ref 8.4–10.5)
CHLORIDE SERPL-SCNC: 103 MMOL/L — SIGNIFICANT CHANGE UP (ref 96–108)
CO2 SERPL-SCNC: 20 MMOL/L — LOW (ref 22–29)
COLOR SPEC: YELLOW — SIGNIFICANT CHANGE UP
CREAT SERPL-MCNC: 0.99 MG/DL — SIGNIFICANT CHANGE UP (ref 0.5–1.3)
DIFF PNL FLD: ABNORMAL
EGFR: 70 ML/MIN/1.73M2 — SIGNIFICANT CHANGE UP
GLUCOSE SERPL-MCNC: 82 MG/DL — SIGNIFICANT CHANGE UP (ref 70–99)
GLUCOSE UR QL: NEGATIVE MG/DL — SIGNIFICANT CHANGE UP
HCT VFR BLD CALC: 36.6 % — LOW (ref 39–50)
HGB BLD-MCNC: 12.3 G/DL — LOW (ref 13–17)
KETONES UR-MCNC: 15 MG/DL
LEGIONELLA AG UR QL: NEGATIVE — SIGNIFICANT CHANGE UP
LEUKOCYTE ESTERASE UR-ACNC: NEGATIVE — SIGNIFICANT CHANGE UP
MAGNESIUM SERPL-MCNC: 1.9 MG/DL — SIGNIFICANT CHANGE UP (ref 1.6–2.6)
MCHC RBC-ENTMCNC: 29.9 PG — SIGNIFICANT CHANGE UP (ref 27–34)
MCHC RBC-ENTMCNC: 33.6 GM/DL — SIGNIFICANT CHANGE UP (ref 32–36)
MCV RBC AUTO: 88.8 FL — SIGNIFICANT CHANGE UP (ref 80–100)
NITRITE UR-MCNC: NEGATIVE — SIGNIFICANT CHANGE UP
PH UR: 6 — SIGNIFICANT CHANGE UP (ref 5–8)
PHOSPHATE SERPL-MCNC: 2.2 MG/DL — LOW (ref 2.4–4.7)
PLATELET # BLD AUTO: 117 K/UL — LOW (ref 150–400)
POTASSIUM SERPL-MCNC: 4.1 MMOL/L — SIGNIFICANT CHANGE UP (ref 3.5–5.3)
POTASSIUM SERPL-SCNC: 4.1 MMOL/L — SIGNIFICANT CHANGE UP (ref 3.5–5.3)
PROCALCITONIN SERPL-MCNC: 0.53 NG/ML — HIGH (ref 0.02–0.1)
PROT SERPL-MCNC: 5.9 G/DL — LOW (ref 6.6–8.7)
PROT UR-MCNC: SIGNIFICANT CHANGE UP MG/DL
RBC # BLD: 4.12 M/UL — LOW (ref 4.2–5.8)
RBC # FLD: 14 % — SIGNIFICANT CHANGE UP (ref 10.3–14.5)
SODIUM SERPL-SCNC: 138 MMOL/L — SIGNIFICANT CHANGE UP (ref 135–145)
SP GR SPEC: 1.01 — SIGNIFICANT CHANGE UP (ref 1–1.03)
UROBILINOGEN FLD QL: 4 MG/DL (ref 0.2–1)
WBC # BLD: 7.76 K/UL — SIGNIFICANT CHANGE UP (ref 3.8–10.5)
WBC # FLD AUTO: 7.76 K/UL — SIGNIFICANT CHANGE UP (ref 3.8–10.5)

## 2024-05-25 PROCEDURE — 99232 SBSQ HOSP IP/OBS MODERATE 35: CPT

## 2024-05-25 RX ORDER — SODIUM,POTASSIUM PHOSPHATES 278-250MG
1 POWDER IN PACKET (EA) ORAL ONCE
Refills: 0 | Status: COMPLETED | OUTPATIENT
Start: 2024-05-25 | End: 2024-05-25

## 2024-05-25 RX ORDER — TAMSULOSIN HYDROCHLORIDE 0.4 MG/1
0.4 CAPSULE ORAL AT BEDTIME
Refills: 0 | Status: DISCONTINUED | OUTPATIENT
Start: 2024-05-25 | End: 2024-05-27

## 2024-05-25 RX ORDER — LABETALOL HCL 100 MG
10 TABLET ORAL ONCE
Refills: 0 | Status: DISCONTINUED | OUTPATIENT
Start: 2024-05-25 | End: 2024-05-25

## 2024-05-25 RX ORDER — HYDRALAZINE HCL 50 MG
5 TABLET ORAL ONCE
Refills: 0 | Status: COMPLETED | OUTPATIENT
Start: 2024-05-25 | End: 2024-05-25

## 2024-05-25 RX ADMIN — PIPERACILLIN AND TAZOBACTAM 25 GRAM(S): 4; .5 INJECTION, POWDER, LYOPHILIZED, FOR SOLUTION INTRAVENOUS at 05:21

## 2024-05-25 RX ADMIN — SODIUM CHLORIDE 50 MILLILITER(S): 9 INJECTION INTRAMUSCULAR; INTRAVENOUS; SUBCUTANEOUS at 05:21

## 2024-05-25 RX ADMIN — ENOXAPARIN SODIUM 40 MILLIGRAM(S): 100 INJECTION SUBCUTANEOUS at 05:21

## 2024-05-25 RX ADMIN — Medication 1 PACKET(S): at 09:49

## 2024-05-25 RX ADMIN — TAMSULOSIN HYDROCHLORIDE 0.4 MILLIGRAM(S): 0.4 CAPSULE ORAL at 21:51

## 2024-05-25 RX ADMIN — Medication 37.5 MICROGRAM(S): at 23:00

## 2024-05-25 RX ADMIN — Medication 5 MILLIGRAM(S): at 21:50

## 2024-05-25 RX ADMIN — PIPERACILLIN AND TAZOBACTAM 25 GRAM(S): 4; .5 INJECTION, POWDER, LYOPHILIZED, FOR SOLUTION INTRAVENOUS at 15:15

## 2024-05-25 RX ADMIN — PIPERACILLIN AND TAZOBACTAM 25 GRAM(S): 4; .5 INJECTION, POWDER, LYOPHILIZED, FOR SOLUTION INTRAVENOUS at 21:50

## 2024-05-25 NOTE — CHART NOTE - NSCHARTNOTEFT_GEN_A_CORE
Pt seen and examined at bedside after RN reporting brief period of confusion  Per daughter at bedside she had helped her father ambulate to bathroom, on his way back to bed he mentioned he needed to go to the kitchen and thought he was in his home.  Pt was able to be reoriented and is currently alert and oriented x 4.  Per daughter pt has been urinating every 30 mins. Bladder scan x 1 without urinary retention.   Also /88 earlier in shift. Daughter defering medications at that time as pt complaining of anxiety and was asymptomatic, no hx of HTN.   Most recent /85.   Denies chest pain, SOB, n/v/d/c, abdominal pain, HA, dizziness, blurry vision, dysuria, hematuria.     VITAL SIGNS:  T(C): 36.8 (05-25-24 @ 21:34), Max: 36.9 (05-24-24 @ 23:56)  T(F): 98.2 (05-25-24 @ 21:34), Max: 98.5 (05-24-24 @ 23:56)  HR: 75 (05-25-24 @ 22:57) (63 - 78)  BP: 123/58 (05-25-24 @ 22:57) (123/58 - 202/88)  RR: 18 (05-25-24 @ 22:57) (18 - 22)  SpO2: 95% (05-25-24 @ 22:57) (93% - 97%)  Wt(kg): --    PHYSICAL EXAM:  GENERAL: NAD, lying in bed comfortably  HEAD:  Atraumatic, Normocephalic  EYES: EOMI, PERRL, conjunctiva and sclera clear  ENT: Moist mucous membranes  CHEST/LUNG: Unlabored respirations  HEART: +S1, S2  EXTREMITIES:  2+ Peripheral Pulses, brisk capillary refill. No clubbing, cyanosis, or edema  NERVOUS SYSTEM:  Alert & Oriented X4, speech clear. Answers questions appropriately. No facial asymmetry. Tongue midline. CN 2-12 intact. No focal neurological deficits.  [facility]: FROM all 4 extremities, full and equal strength  SKIN: Warm, dry    Plan:  -5mg IV hydralazine for HTN  -Check UA
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 Patient admitted for acute hypoxemic respiratory failure 2/2 LLL PNA. Currently on 2L NC and IV antibiotics. Cultures pending. SLP consulted. Patient seen and examined at bedside. A&Ox2 (self, place, thinks year is 2034). Appears in no distress, denying complaints.

## 2024-05-26 LAB
ANION GAP SERPL CALC-SCNC: 16 MMOL/L — SIGNIFICANT CHANGE UP (ref 5–17)
BACTERIA # UR AUTO: NEGATIVE /HPF — SIGNIFICANT CHANGE UP
BUN SERPL-MCNC: 18.9 MG/DL — SIGNIFICANT CHANGE UP (ref 8–20)
CALCIUM SERPL-MCNC: 8.5 MG/DL — SIGNIFICANT CHANGE UP (ref 8.4–10.5)
CAST: 0 /LPF — SIGNIFICANT CHANGE UP (ref 0–4)
CHLORIDE SERPL-SCNC: 101 MMOL/L — SIGNIFICANT CHANGE UP (ref 96–108)
CO2 SERPL-SCNC: 20 MMOL/L — LOW (ref 22–29)
CREAT SERPL-MCNC: 1.12 MG/DL — SIGNIFICANT CHANGE UP (ref 0.5–1.3)
EGFR: 60 ML/MIN/1.73M2 — SIGNIFICANT CHANGE UP
GLUCOSE SERPL-MCNC: 90 MG/DL — SIGNIFICANT CHANGE UP (ref 70–99)
HCT VFR BLD CALC: 35.8 % — LOW (ref 39–50)
HGB BLD-MCNC: 12.3 G/DL — LOW (ref 13–17)
MAGNESIUM SERPL-MCNC: 1.9 MG/DL — SIGNIFICANT CHANGE UP (ref 1.6–2.6)
MCHC RBC-ENTMCNC: 30 PG — SIGNIFICANT CHANGE UP (ref 27–34)
MCHC RBC-ENTMCNC: 34.4 GM/DL — SIGNIFICANT CHANGE UP (ref 32–36)
MCV RBC AUTO: 87.3 FL — SIGNIFICANT CHANGE UP (ref 80–100)
PHOSPHATE SERPL-MCNC: 2.7 MG/DL — SIGNIFICANT CHANGE UP (ref 2.4–4.7)
PLATELET # BLD AUTO: 132 K/UL — LOW (ref 150–400)
POTASSIUM SERPL-MCNC: 3.5 MMOL/L — SIGNIFICANT CHANGE UP (ref 3.5–5.3)
POTASSIUM SERPL-SCNC: 3.5 MMOL/L — SIGNIFICANT CHANGE UP (ref 3.5–5.3)
RBC # BLD: 4.1 M/UL — LOW (ref 4.2–5.8)
RBC # FLD: 14 % — SIGNIFICANT CHANGE UP (ref 10.3–14.5)
RBC CASTS # UR COMP ASSIST: 12 /HPF — HIGH (ref 0–4)
S PNEUM AG UR QL: NEGATIVE — SIGNIFICANT CHANGE UP
SODIUM SERPL-SCNC: 137 MMOL/L — SIGNIFICANT CHANGE UP (ref 135–145)
SQUAMOUS # UR AUTO: 0 /HPF — SIGNIFICANT CHANGE UP (ref 0–5)
WBC # BLD: 7.79 K/UL — SIGNIFICANT CHANGE UP (ref 3.8–10.5)
WBC # FLD AUTO: 7.79 K/UL — SIGNIFICANT CHANGE UP (ref 3.8–10.5)
WBC UR QL: 0 /HPF — SIGNIFICANT CHANGE UP (ref 0–5)

## 2024-05-26 PROCEDURE — 99232 SBSQ HOSP IP/OBS MODERATE 35: CPT

## 2024-05-26 RX ADMIN — Medication 650 MILLIGRAM(S): at 05:29

## 2024-05-26 RX ADMIN — ENOXAPARIN SODIUM 40 MILLIGRAM(S): 100 INJECTION SUBCUTANEOUS at 05:29

## 2024-05-26 RX ADMIN — Medication 650 MILLIGRAM(S): at 06:29

## 2024-05-26 RX ADMIN — TAMSULOSIN HYDROCHLORIDE 0.4 MILLIGRAM(S): 0.4 CAPSULE ORAL at 21:31

## 2024-05-26 RX ADMIN — Medication 37.5 MICROGRAM(S): at 21:31

## 2024-05-26 RX ADMIN — PIPERACILLIN AND TAZOBACTAM 25 GRAM(S): 4; .5 INJECTION, POWDER, LYOPHILIZED, FOR SOLUTION INTRAVENOUS at 05:29

## 2024-05-26 RX ADMIN — PIPERACILLIN AND TAZOBACTAM 25 GRAM(S): 4; .5 INJECTION, POWDER, LYOPHILIZED, FOR SOLUTION INTRAVENOUS at 21:31

## 2024-05-26 RX ADMIN — PIPERACILLIN AND TAZOBACTAM 25 GRAM(S): 4; .5 INJECTION, POWDER, LYOPHILIZED, FOR SOLUTION INTRAVENOUS at 13:33

## 2024-05-26 NOTE — PROGRESS NOTE ADULT - REASON FOR ADMISSION
Acute hypoxemic respiratory failure with sepsis due to bacterial pneumonia
Acute hypoxemic respiratory failure with sepsis due to bacterial pneumonia

## 2024-05-26 NOTE — PROGRESS NOTE ADULT - ASSESSMENT
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    Acute hypoxemic respiratory failure due to Aspiration pneumonia  -sepsis present on admission but resolved  -hypoxia resolved  -CT chest w/ LLL consolidation, secretions w/in trachea concerning for aspiration  -s/p cefepime, vanco, azithro by ED  -blood cultures negative  -cont with Zosyn; change to Augmentin on discharge  -MRSA neg, urine Strep and Legionella negative  -Diet advanced to pureed and mildly thickened liquids; SLP recs appreciated  -Aspiration precautions    Elevated LFTs  -Mild hyperbilirubinemia, suspect from sepsis   -Normal hepatobiliary system on CT A/P  -repeat LFTS in AM    Thrombocytopenia - improving  -Likely related to infectious process  -monitor CBC    Hx BPH  -continue Tamsulosin     Hx hypothyroidism  -continue synthroid    Elevated BP  -overnight while agitated  -no history of HTN  -received 1 dose of IV hydralazine  -BP remains stable  -observe off anti-HTN    DVT ppx - Lovenox 40mg daily     Dispo: Remains acute; monitor BP. SLP reassessment in AM and discharge home. PT - independent    Plan discussed with patient, daughter, RN, CCM, SLP  
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    Acute hypoxemic respiratory failure with sepsis due to bacterial pneumonia  -CT chest w/ LLL consolidation, secretions w/in trachea concerning for aspiration  -s/p cefepime, vanco, azithro by ED, cont with Zosyn -- plan to change to Augmentin and Flagyl on dc for total 10 days  -MRSA neg, f/u urine Strep and Legionella   -Diet advancement per speech  -Blood NGTD  -s/p 2L given by ED, lactate WNL  -weaned off NC    Elevated LFTs  -Mild hyperbilirubinemia, suspect from sepsis   -Normal hepatobiliary system on CT A/P  -Tbili downtrending     Thrombocytopenia  -Mild, no prior for comparison  -Likely related to infectious process, monitor    Hx BPH  -Tamsulosin on hold while NPO    Hx hypothyroidism  -IV levothyroxine while NPO    Prophylactic measure-Lovenox 40mg daily   Dispo: likely in AM pending speech re-eval for diet advancement

## 2024-05-26 NOTE — PROGRESS NOTE ADULT - SUBJECTIVE AND OBJECTIVE BOX
CHIEF COMPLAINT/INTERVAL HISTORY:    Patient is a 89y old  Male who presents with a chief complaint of Acute hypoxemic respiratory failure with sepsis due to bacterial pneumonia (25 May 2024 11:53)    SUBJECTIVE & OBJECTIVE: Pt seen and examined at bedside. Hypertensive overnight requiring IV hydralazine. Diet advanced to pureed. Continue IV abx.    ROS: No chest pain, palpitations, SOB, light headedness, dizziness, headache, nausea/vomiting, fevers/chills, abdominal pain, dysuria.    ICU Vital Signs Last 24 Hrs  T(C): 36.6 (26 May 2024 09:53), Max: 36.8 (25 May 2024 19:14)  T(F): 97.9 (26 May 2024 09:53), Max: 98.3 (25 May 2024 19:14)  HR: 69 (26 May 2024 09:53) (66 - 78)  BP: 116/74 (26 May 2024 09:53) (116/74 - 202/88)  RR: 18 (26 May 2024 09:53) (18 - 22)  SpO2: 98% (26 May 2024 09:53) (93% - 98%)    O2 Parameters below as of 26 May 2024 04:54  Patient On (Oxygen Delivery Method): room air      MEDICATIONS  (STANDING):  dextrose 50% Injectable 25 Gram(s) IV Push once  dextrose 50% Injectable 25 Gram(s) IV Push once  dextrose 50% Injectable 12.5 Gram(s) IV Push once  dextrose Oral Gel 15 Gram(s) Oral once  enoxaparin Injectable 40 milliGRAM(s) SubCutaneous every 24 hours  glucagon  Injectable 1 milliGRAM(s) IntraMuscular once  levothyroxine Injectable 37.5 MICROGram(s) IV Push at bedtime  piperacillin/tazobactam IVPB.. 3.375 Gram(s) IV Intermittent every 8 hours  tamsulosin 0.4 milliGRAM(s) Oral at bedtime    MEDICATIONS  (PRN):  acetaminophen  Suppository .. 650 milliGRAM(s) Rectal every 6 hours PRN Temp greater or equal to 38C (100.4F), Mild Pain (1 - 3), Moderate Pain (4 - 6)  ondansetron Injectable 4 milliGRAM(s) IV Push every 6 hours PRN Nausea and/or Vomiting      LABS:                        12.3   7.79  )-----------( 132      ( 26 May 2024 05:07 )             35.8     05-26    137  |  101  |  18.9  ----------------------------<  90  3.5   |  20.0<L>  |  1.12    Ca    8.5      26 May 2024 05:07  Phos  2.7     05-26  Mg     1.9     05-26    TPro  5.9<L>  /  Alb  3.3  /  TBili  2.6<H>  /  DBili  x   /  AST  29  /  ALT  24  /  AlkPhos  88  05-25      Urinalysis Basic - ( 26 May 2024 05:07 )    Color: x / Appearance: x / SG: x / pH: x  Gluc: 90 mg/dL / Ketone: x  / Bili: x / Urobili: x   Blood: x / Protein: x / Nitrite: x   Leuk Esterase: x / RBC: x / WBC x   Sq Epi: x / Non Sq Epi: x / Bacteria: x      PHYSICAL EXAM:    GENERAL: elderly male, sitting in chair, NAD  HEAD:  Atraumatic, Normocephalic  EYES: EOMI, PERRLA, conjunctiva and sclera clear  ENMT: Moist mucous membranes, mandibular surgery  NECK: Supple   NERVOUS SYSTEM:  Alert & Oriented X3, no motor or sensory deficits  CHEST/LUNG: diminished left sided breath sounds  HEART: Regular rate and rhythm; + S1/S2  ABDOMEN: Soft, Nontender, Nondistended; Bowel sounds present  EXTREMITIES:  no pedal edema
Leroy Cervantes M.D.    Patient is a 89y old  Male who presents with a chief complaint of Acute hypoxemic respiratory failure with sepsis due to bacterial pneumonia (24 May 2024 03:19)      SUBJECTIVE / OVERNIGHT EVENTS: no event overnight. Weaned off O2 and able to ambulate without issue.     MEDICATIONS  (STANDING):  dextrose 50% Injectable 25 Gram(s) IV Push once  dextrose 50% Injectable 12.5 Gram(s) IV Push once  dextrose 50% Injectable 25 Gram(s) IV Push once  dextrose Oral Gel 15 Gram(s) Oral once  enoxaparin Injectable 40 milliGRAM(s) SubCutaneous every 24 hours  glucagon  Injectable 1 milliGRAM(s) IntraMuscular once  levothyroxine Injectable 37.5 MICROGram(s) IV Push at bedtime  piperacillin/tazobactam IVPB.. 3.375 Gram(s) IV Intermittent every 8 hours  tamsulosin 0.4 milliGRAM(s) Oral at bedtime    MEDICATIONS  (PRN):  acetaminophen  Suppository .. 650 milliGRAM(s) Rectal every 6 hours PRN Temp greater or equal to 38C (100.4F), Mild Pain (1 - 3), Moderate Pain (4 - 6)  ondansetron Injectable 4 milliGRAM(s) IV Push every 6 hours PRN Nausea and/or Vomiting      I&O's Summary      PHYSICAL EXAM:  Vital Signs Last 24 Hrs  T(C): 36.4 (25 May 2024 11:18), Max: 36.9 (24 May 2024 23:56)  T(F): 97.6 (25 May 2024 11:18), Max: 98.5 (24 May 2024 23:56)  HR: 65 (25 May 2024 11:18) (63 - 67)  BP: 172/78 (25 May 2024 11:18) (145/66 - 172/78)  BP(mean): --  RR: 18 (25 May 2024 11:18) (18 - 18)  SpO2: 97% (25 May 2024 11:18) (95% - 100%)    Parameters below as of 25 May 2024 11:18  Patient On (Oxygen Delivery Method): room air      CONSTITUTIONAL: NAD, well-groomed  RESPIRATORY: Normal respiratory effort; course b/l breath sound  CARDIOVASCULAR: Regular rate and rhythm, no LE edema  ABDOMEN: Nontender to palpation, normoactive bowel sounds  PSYCH: A+O x3; affect appropriate  NEUROLOGY: CN 2-12 are intact and symmetric; no gross sensory deficits;       LABS:                        12.3   7.76  )-----------( 117      ( 25 May 2024 06:34 )             36.6     05-25    138  |  103  |  20.1<H>  ----------------------------<  82  4.1   |  20.0<L>  |  0.99    Ca    8.6      25 May 2024 06:34  Phos  2.2     05-25  Mg     1.9     05-25    TPro  5.9<L>  /  Alb  3.3  /  TBili  2.6<H>  /  DBili  x   /  AST  29  /  ALT  24  /  AlkPhos  88  05-25    PT/INR - ( 23 May 2024 17:28 )   PT: 11.5 sec;   INR: 1.04 ratio         PTT - ( 23 May 2024 17:28 )  PTT:26.5 sec      Urinalysis Basic - ( 25 May 2024 06:34 )    Color: x / Appearance: x / SG: x / pH: x  Gluc: 82 mg/dL / Ketone: x  / Bili: x / Urobili: x   Blood: x / Protein: x / Nitrite: x   Leuk Esterase: x / RBC: x / WBC x   Sq Epi: x / Non Sq Epi: x / Bacteria: x        Culture - Urine (collected 23 May 2024 17:51)  Source: Clean Catch Clean Catch (Midstream)  Final Report (24 May 2024 23:31):    <10,000 CFU/mL Normal Urogenital Bria    Culture - Blood (collected 23 May 2024 17:28)  Source: .Blood Blood-Peripheral  Preliminary Report (25 May 2024 02:02):    No growth at 24 hours    Culture - Blood (collected 23 May 2024 17:25)  Source: .Blood Blood-Peripheral  Preliminary Report (25 May 2024 02:02):    No growth at 24 hours      CAPILLARY BLOOD GLUCOSE          RADIOLOGY & ADDITIONAL TESTS:  Results Reviewed:   Imaging Personally Reviewed:  Electrocardiogram Personally Reviewed:

## 2024-05-27 ENCOUNTER — TRANSCRIPTION ENCOUNTER (OUTPATIENT)
Age: 89
End: 2024-05-27

## 2024-05-27 VITALS
SYSTOLIC BLOOD PRESSURE: 133 MMHG | TEMPERATURE: 98 F | DIASTOLIC BLOOD PRESSURE: 70 MMHG | HEART RATE: 74 BPM | RESPIRATION RATE: 18 BRPM | OXYGEN SATURATION: 96 %

## 2024-05-27 LAB
ALBUMIN SERPL ELPH-MCNC: 3.2 G/DL — LOW (ref 3.3–5.2)
ALP SERPL-CCNC: 74 U/L — SIGNIFICANT CHANGE UP (ref 40–120)
ALT FLD-CCNC: 18 U/L — SIGNIFICANT CHANGE UP
AST SERPL-CCNC: 23 U/L — SIGNIFICANT CHANGE UP
BILIRUB DIRECT SERPL-MCNC: 0.4 MG/DL — HIGH (ref 0–0.3)
BILIRUB INDIRECT FLD-MCNC: 1 MG/DL — SIGNIFICANT CHANGE UP (ref 0.2–1)
BILIRUB SERPL-MCNC: 1.4 MG/DL — SIGNIFICANT CHANGE UP (ref 0.4–2)
PROT SERPL-MCNC: 5.8 G/DL — LOW (ref 6.6–8.7)

## 2024-05-27 PROCEDURE — 87449 NOS EACH ORGANISM AG IA: CPT

## 2024-05-27 PROCEDURE — 85730 THROMBOPLASTIN TIME PARTIAL: CPT

## 2024-05-27 PROCEDURE — 87641 MR-STAPH DNA AMP PROBE: CPT

## 2024-05-27 PROCEDURE — 87086 URINE CULTURE/COLONY COUNT: CPT

## 2024-05-27 PROCEDURE — 85610 PROTHROMBIN TIME: CPT

## 2024-05-27 PROCEDURE — 85027 COMPLETE CBC AUTOMATED: CPT

## 2024-05-27 PROCEDURE — 85025 COMPLETE CBC W/AUTO DIFF WBC: CPT

## 2024-05-27 PROCEDURE — 99285 EMERGENCY DEPT VISIT HI MDM: CPT | Mod: 25

## 2024-05-27 PROCEDURE — 0241U: CPT

## 2024-05-27 PROCEDURE — 93005 ELECTROCARDIOGRAM TRACING: CPT

## 2024-05-27 PROCEDURE — 84100 ASSAY OF PHOSPHORUS: CPT

## 2024-05-27 PROCEDURE — 82248 BILIRUBIN DIRECT: CPT

## 2024-05-27 PROCEDURE — 71045 X-RAY EXAM CHEST 1 VIEW: CPT

## 2024-05-27 PROCEDURE — 84484 ASSAY OF TROPONIN QUANT: CPT

## 2024-05-27 PROCEDURE — 87637 SARSCOV2&INF A&B&RSV AMP PRB: CPT

## 2024-05-27 PROCEDURE — 80048 BASIC METABOLIC PNL TOTAL CA: CPT

## 2024-05-27 PROCEDURE — 81001 URINALYSIS AUTO W/SCOPE: CPT

## 2024-05-27 PROCEDURE — 83605 ASSAY OF LACTIC ACID: CPT

## 2024-05-27 PROCEDURE — 87040 BLOOD CULTURE FOR BACTERIA: CPT

## 2024-05-27 PROCEDURE — 99239 HOSP IP/OBS DSCHRG MGMT >30: CPT

## 2024-05-27 PROCEDURE — 87640 STAPH A DNA AMP PROBE: CPT

## 2024-05-27 PROCEDURE — 71250 CT THORAX DX C-: CPT | Mod: MC

## 2024-05-27 PROCEDURE — 80076 HEPATIC FUNCTION PANEL: CPT

## 2024-05-27 PROCEDURE — 96375 TX/PRO/DX INJ NEW DRUG ADDON: CPT

## 2024-05-27 PROCEDURE — 83735 ASSAY OF MAGNESIUM: CPT

## 2024-05-27 PROCEDURE — 36415 COLL VENOUS BLD VENIPUNCTURE: CPT

## 2024-05-27 PROCEDURE — 74176 CT ABD & PELVIS W/O CONTRAST: CPT | Mod: MC

## 2024-05-27 PROCEDURE — 80053 COMPREHEN METABOLIC PANEL: CPT

## 2024-05-27 PROCEDURE — 97163 PT EVAL HIGH COMPLEX 45 MIN: CPT

## 2024-05-27 PROCEDURE — 96374 THER/PROPH/DIAG INJ IV PUSH: CPT

## 2024-05-27 PROCEDURE — 87899 AGENT NOS ASSAY W/OPTIC: CPT

## 2024-05-27 PROCEDURE — 84145 PROCALCITONIN (PCT): CPT

## 2024-05-27 RX ADMIN — PIPERACILLIN AND TAZOBACTAM 25 GRAM(S): 4; .5 INJECTION, POWDER, LYOPHILIZED, FOR SOLUTION INTRAVENOUS at 05:37

## 2024-05-27 RX ADMIN — ENOXAPARIN SODIUM 40 MILLIGRAM(S): 100 INJECTION SUBCUTANEOUS at 05:38

## 2024-05-27 NOTE — DISCHARGE NOTE NURSING/CASE MANAGEMENT/SOCIAL WORK - NSSCNAMETXT_GEN_ALL_CORE
Patient is a 82y old  Male who presents with a chief complaint of s/p unwitnessed fall with head strike (11 Jan 2024 13:11)      INTERVAL HPI/OVERNIGHT EVENTS:  No acute events reported overnight.    TODAY:  Seen and examined bedside. Denies Chest pain, abdominal pain, shortness of breath. Due to get MBS today    MEDICATIONS  (STANDING):  atorvastatin 80 milliGRAM(s) Oral at bedtime  chlorhexidine 2% Cloths 1 Application(s) Topical <User Schedule>  doxazosin 2 milliGRAM(s) Oral at bedtime  folic acid 1 milliGRAM(s) Oral daily  lacosamide 50 milliGRAM(s) Oral two times a day  metoprolol tartrate 25 milliGRAM(s) Oral every 6 hours  Nephro-roma 1 Tablet(s) Oral daily  pantoprazole  Injectable 40 milliGRAM(s) IV Push two times a day  vancomycin    Solution 125 milliGRAM(s) Oral every 6 hours    MEDICATIONS  (PRN):  acetaminophen     Tablet .. 650 milliGRAM(s) Oral every 6 hours PRN Temp greater or equal to 38C (100.4F), Mild Pain (1 - 3)  artificial tears (preservative free) Ophthalmic Solution 1 Drop(s) Both EYES every 6 hours PRN Dry Eyes      Allergies    penicillin (Rash)  heparin (Other (Severe))  penicillin (Hives)  Cipro (Other)  Levaquin (Other)  heparin (Other)    Intolerances        REVIEW OF SYSTEMS:  Review of systems is otherwise negative unless mentioned in HPI above.    Vital Signs Last 24 Hrs  T(C): 37 (12 Jan 2024 08:19), Max: 37.1 (12 Jan 2024 05:04)  T(F): 98.6 (12 Jan 2024 08:19), Max: 98.7 (12 Jan 2024 05:04)  HR: 107 (12 Jan 2024 08:19) (93 - 114)  BP: 112/67 (12 Jan 2024 08:19) (112/67 - 149/74)  BP(mean): --  RR: 17 (12 Jan 2024 08:19) (17 - 18)  SpO2: 93% (12 Jan 2024 08:19) (93% - 97%)    Parameters below as of 12 Jan 2024 08:19  Patient On (Oxygen Delivery Method): room air        PHYSICAL EXAM:  CONSTITUTIONAL: NAD, A&O x2, awake alert  ENMT: Healed surgical scar on right side  RESPIRATORY: Normal respiratory effort; lungs are clear to auscultation bilaterally  CARDIOVASCULAR: Regular rate and rhythm, normal S1 and S2, no murmur/rub/gallop.  ABDOMEN: Nontender to palpation, no rebound/guarding; No hepatosplenomegaly  MUSCLOSKELETAL:  No edema  NEUROLOGY: Awake, oriented x 2. Left sided hemiplegia   SKIN : stage 2 sacral decub       LABS:                        9.9    9.57  )-----------( 231      ( 12 Jan 2024 06:07 )             29.5     01-12    138  |  103  |  20.7<H>  ----------------------------<  110<H>  3.5   |  21.0<L>  |  0.78    Ca    9.4      12 Jan 2024 06:07  Mg     1.7     01-12    TPro  6.5<L>  /  Alb  2.8<L>  /  TBili  0.3<L>  /  DBili  x   /  AST  13  /  ALT  12  /  AlkPhos  101  01-12      Urinalysis Basic - ( 12 Jan 2024 06:07 )    Color: x / Appearance: x / SG: x / pH: x  Gluc: 110 mg/dL / Ketone: x  / Bili: x / Urobili: x   Blood: x / Protein: x / Nitrite: x   Leuk Esterase: x / RBC: x / WBC x   Sq Epi: x / Non Sq Epi: x / Bacteria: x      CAPILLARY BLOOD GLUCOSE          RADIOLOGY & ADDITIONAL TESTS:    Imaging Personally Reviewed:  [ ] YES  [ ] NO    Consultant(s) Notes Reviewed:  [ ] YES  [ ] NO    Care Discussed with Consultants/Other Providers [ ] YES  [ ] NO    Plan of Care discussed with Housestaff [ ]YES [ ] NO [facility]

## 2024-05-27 NOTE — DISCHARGE NOTE PROVIDER - ATTENDING DISCHARGE PHYSICAL EXAMINATION:
PHYSICAL EXAM:    GENERAL: elderly male, sitting in chair, NAD  HEAD:  Atraumatic, Normocephalic  EYES: EOMI, PERRLA, conjunctiva and sclera clear  ENMT: Moist mucous membranes, mandibular surgery  NECK: Supple   NERVOUS SYSTEM:  Alert & Oriented X3, no motor or sensory deficits  CHEST/LUNG: coarse  breath sounds  HEART: Regular rate and rhythm; + S1/S2  ABDOMEN: Soft, Nontender, Nondistended; Bowel sounds present  EXTREMITIES:  no pedal edema

## 2024-05-27 NOTE — DISCHARGE NOTE NURSING/CASE MANAGEMENT/SOCIAL WORK - PATIENT PORTAL LINK FT
You can access the FollowMyHealth Patient Portal offered by [facility] by registering at the following website: [email or web address]. By joining [employer]’s FollowMyHealth portal, you will also be able to view your health information using other applications (apps) compatible with our system.

## 2024-05-27 NOTE — DISCHARGE NOTE PROVIDER - NSDCMRMEDTOKEN_GEN_ALL_CORE_FT
amoxicillin-clavulanate 400 mg-57 mg/5 mL oral liquid: 10 milliliter(s) orally 2 times a day  levothyroxine 75 mcg (0.075 mg) oral tablet: 1 tab(s) orally once a day  tamsulosin 0.4 mg oral capsule: 1 cap(s) orally once a day

## 2024-05-27 NOTE — DISCHARGE NOTE PROVIDER - NSDCCPCAREPLAN_GEN_ALL_CORE_FT
PRINCIPAL DISCHARGE DIAGNOSIS  Diagnosis: Pneumonia  Assessment and Plan of Treatment: please take augmentin as directed; next dose with dinner tonight  adhere to soft and bite sized diet  aspiration precautions as directed  follow up with your PCP within 1 week  repeat imaging within 4-6 weeks      SECONDARY DISCHARGE DIAGNOSES  Diagnosis: Acute respiratory failure with hypoxia  Assessment and Plan of Treatment: resolved  complete antibiotics as above    Diagnosis: Elevated BP without diagnosis of hypertension  Assessment and Plan of Treatment: your BP has been stable for the last 24 hours off BP medications  adhere to a low sodium diet  keep a log of your BP twice a day at home and bring it to your next cardiologist appointment

## 2024-05-27 NOTE — DISCHARGE NOTE PROVIDER - HOSPITAL COURSE
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    Acute hypoxemic respiratory failure due to Aspiration pneumonia  -sepsis present on admission but resolved  -hypoxia resolved  -CT chest w/ LLL consolidation, secretions w/in trachea concerning for aspiration  -s/p cefepime, vanco, azithro by ED  -blood cultures negative  -cont with Zosyn; switch to Augmentin to complete course  -MRSA neg, urine Strep and Legionella negative  -Diet advanced to soft and bite sized with thin liquids  -Aspiration precautions    Elevated LFTs - resolved  -Mild hyperbilirubinemia, suspect from sepsis resolved  -Normal hepatobiliary system on CT A/P    Thrombocytopenia - improving  -Likely related to infectious process  -monitor CBC    Hx BPH  -continue Tamsulosin     Hx hypothyroidism  -continue synthroid    Elevated BP - resolved  -family instructed to check BP twice a day and keep a log to bring to their next doctors appointment  -observe off anti-HTN  -low sodium diet

## 2024-05-29 LAB
CULTURE RESULTS: SIGNIFICANT CHANGE UP
CULTURE RESULTS: SIGNIFICANT CHANGE UP
SPECIMEN SOURCE: SIGNIFICANT CHANGE UP
SPECIMEN SOURCE: SIGNIFICANT CHANGE UP

## 2024-09-18 ENCOUNTER — INPATIENT (INPATIENT)
Facility: HOSPITAL | Age: 89
LOS: 1 days | Discharge: HOME | DRG: 177 | End: 2024-09-20
Attending: STUDENT IN AN ORGANIZED HEALTH CARE EDUCATION/TRAINING PROGRAM | Admitting: EMERGENCY MEDICINE
Payer: MEDICARE

## 2024-09-18 VITALS
RESPIRATION RATE: 20 BRPM | HEIGHT: 62 IN | DIASTOLIC BLOOD PRESSURE: 84 MMHG | TEMPERATURE: 103 F | SYSTOLIC BLOOD PRESSURE: 158 MMHG | HEART RATE: 86 BPM | OXYGEN SATURATION: 96 % | WEIGHT: 119.93 LBS

## 2024-09-18 DIAGNOSIS — U07.1 COVID-19: ICD-10-CM

## 2024-09-18 DIAGNOSIS — Z95.0 PRESENCE OF CARDIAC PACEMAKER: Chronic | ICD-10-CM

## 2024-09-18 PROBLEM — E03.9 HYPOTHYROIDISM, UNSPECIFIED: Chronic | Status: ACTIVE | Noted: 2024-05-24

## 2024-09-18 PROBLEM — R55 SYNCOPE AND COLLAPSE: Chronic | Status: ACTIVE | Noted: 2024-05-24

## 2024-09-18 PROBLEM — N40.0 BENIGN PROSTATIC HYPERPLASIA WITHOUT LOWER URINARY TRACT SYMPTOMS: Chronic | Status: ACTIVE | Noted: 2024-05-23

## 2024-09-18 LAB
ALBUMIN SERPL ELPH-MCNC: 4.5 G/DL — SIGNIFICANT CHANGE UP (ref 3.3–5.2)
ALP SERPL-CCNC: 86 U/L — SIGNIFICANT CHANGE UP (ref 40–120)
ALT FLD-CCNC: 16 U/L — SIGNIFICANT CHANGE UP
ANION GAP SERPL CALC-SCNC: 15 MMOL/L — SIGNIFICANT CHANGE UP (ref 5–17)
ANISOCYTOSIS BLD QL: SLIGHT — SIGNIFICANT CHANGE UP
APPEARANCE UR: CLEAR — SIGNIFICANT CHANGE UP
APTT BLD: 28.2 SEC — SIGNIFICANT CHANGE UP (ref 24.5–35.6)
AST SERPL-CCNC: 27 U/L — SIGNIFICANT CHANGE UP
B PERT DNA SPEC QL NAA+PROBE: SIGNIFICANT CHANGE UP
BACTERIA # UR AUTO: NEGATIVE /HPF — SIGNIFICANT CHANGE UP
BASOPHILS # BLD AUTO: 0 K/UL — SIGNIFICANT CHANGE UP (ref 0–0.2)
BASOPHILS NFR BLD AUTO: 0 % — SIGNIFICANT CHANGE UP (ref 0–2)
BILIRUB SERPL-MCNC: 2.8 MG/DL — HIGH (ref 0.4–2)
BILIRUB UR-MCNC: NEGATIVE — SIGNIFICANT CHANGE UP
BUN SERPL-MCNC: 21.3 MG/DL — HIGH (ref 8–20)
C PNEUM DNA SPEC QL NAA+PROBE: SIGNIFICANT CHANGE UP
CALCIUM SERPL-MCNC: 9.1 MG/DL — SIGNIFICANT CHANGE UP (ref 8.4–10.5)
CAST: 0 /LPF — SIGNIFICANT CHANGE UP (ref 0–4)
CHLORIDE SERPL-SCNC: 97 MMOL/L — SIGNIFICANT CHANGE UP (ref 96–108)
CO2 SERPL-SCNC: 22 MMOL/L — SIGNIFICANT CHANGE UP (ref 22–29)
COLOR SPEC: YELLOW — SIGNIFICANT CHANGE UP
CREAT SERPL-MCNC: 1.27 MG/DL — SIGNIFICANT CHANGE UP (ref 0.5–1.3)
DIFF PNL FLD: ABNORMAL
EGFR: 52 ML/MIN/1.73M2 — LOW
EOSINOPHIL # BLD AUTO: 0 K/UL — SIGNIFICANT CHANGE UP (ref 0–0.5)
EOSINOPHIL NFR BLD AUTO: 0 % — SIGNIFICANT CHANGE UP (ref 0–6)
FLUAV H1 2009 PAND RNA SPEC QL NAA+PROBE: SIGNIFICANT CHANGE UP
FLUAV H1 RNA SPEC QL NAA+PROBE: SIGNIFICANT CHANGE UP
FLUAV H3 RNA SPEC QL NAA+PROBE: SIGNIFICANT CHANGE UP
FLUAV SUBTYP SPEC NAA+PROBE: SIGNIFICANT CHANGE UP
FLUBV RNA SPEC QL NAA+PROBE: SIGNIFICANT CHANGE UP
GAS PNL BLDV: SIGNIFICANT CHANGE UP
GIANT PLATELETS BLD QL SMEAR: PRESENT — SIGNIFICANT CHANGE UP
GLUCOSE SERPL-MCNC: 112 MG/DL — HIGH (ref 70–99)
GLUCOSE UR QL: NEGATIVE MG/DL — SIGNIFICANT CHANGE UP
HADV DNA SPEC QL NAA+PROBE: SIGNIFICANT CHANGE UP
HCOV PNL SPEC NAA+PROBE: SIGNIFICANT CHANGE UP
HCT VFR BLD CALC: 41 % — SIGNIFICANT CHANGE UP (ref 39–50)
HGB BLD-MCNC: 13.6 G/DL — SIGNIFICANT CHANGE UP (ref 13–17)
HMPV RNA SPEC QL NAA+PROBE: SIGNIFICANT CHANGE UP
HPIV1 RNA SPEC QL NAA+PROBE: SIGNIFICANT CHANGE UP
HPIV2 RNA SPEC QL NAA+PROBE: SIGNIFICANT CHANGE UP
HPIV3 RNA SPEC QL NAA+PROBE: SIGNIFICANT CHANGE UP
HPIV4 RNA SPEC QL NAA+PROBE: SIGNIFICANT CHANGE UP
INR BLD: 1.06 RATIO — SIGNIFICANT CHANGE UP (ref 0.85–1.18)
KETONES UR-MCNC: NEGATIVE MG/DL — SIGNIFICANT CHANGE UP
LEUKOCYTE ESTERASE UR-ACNC: ABNORMAL
LYMPHOCYTES # BLD AUTO: 0.4 K/UL — LOW (ref 1–3.3)
LYMPHOCYTES # BLD AUTO: 6.1 % — LOW (ref 13–44)
MANUAL SMEAR VERIFICATION: SIGNIFICANT CHANGE UP
MCHC RBC-ENTMCNC: 29.4 PG — SIGNIFICANT CHANGE UP (ref 27–34)
MCHC RBC-ENTMCNC: 33.2 GM/DL — SIGNIFICANT CHANGE UP (ref 32–36)
MCV RBC AUTO: 88.6 FL — SIGNIFICANT CHANGE UP (ref 80–100)
MICROCYTES BLD QL: SLIGHT — SIGNIFICANT CHANGE UP
MONOCYTES # BLD AUTO: 0.46 K/UL — SIGNIFICANT CHANGE UP (ref 0–0.9)
MONOCYTES NFR BLD AUTO: 6.9 % — SIGNIFICANT CHANGE UP (ref 2–14)
NEUTROPHILS # BLD AUTO: 5.75 K/UL — SIGNIFICANT CHANGE UP (ref 1.8–7.4)
NEUTROPHILS NFR BLD AUTO: 87 % — HIGH (ref 43–77)
NITRITE UR-MCNC: NEGATIVE — SIGNIFICANT CHANGE UP
PH UR: 6.5 — SIGNIFICANT CHANGE UP (ref 5–8)
PLAT MORPH BLD: NORMAL — SIGNIFICANT CHANGE UP
PLATELET # BLD AUTO: 115 K/UL — LOW (ref 150–400)
POTASSIUM SERPL-MCNC: 4.7 MMOL/L — SIGNIFICANT CHANGE UP (ref 3.5–5.3)
POTASSIUM SERPL-SCNC: 4.7 MMOL/L — SIGNIFICANT CHANGE UP (ref 3.5–5.3)
PROT SERPL-MCNC: 7.4 G/DL — SIGNIFICANT CHANGE UP (ref 6.6–8.7)
PROT UR-MCNC: 100 MG/DL
PROTHROM AB SERPL-ACNC: 11.7 SEC — SIGNIFICANT CHANGE UP (ref 9.5–13)
RAPID RVP RESULT: DETECTED
RBC # BLD: 4.63 M/UL — SIGNIFICANT CHANGE UP (ref 4.2–5.8)
RBC # FLD: 14 % — SIGNIFICANT CHANGE UP (ref 10.3–14.5)
RBC BLD AUTO: ABNORMAL
RBC CASTS # UR COMP ASSIST: 77 /HPF — HIGH (ref 0–4)
RV+EV RNA SPEC QL NAA+PROBE: SIGNIFICANT CHANGE UP
SARS-COV-2 RNA SPEC QL NAA+PROBE: DETECTED
SODIUM SERPL-SCNC: 133 MMOL/L — LOW (ref 135–145)
SP GR SPEC: 1.02 — SIGNIFICANT CHANGE UP (ref 1–1.03)
SQUAMOUS # UR AUTO: 0 /HPF — SIGNIFICANT CHANGE UP (ref 0–5)
UROBILINOGEN FLD QL: 1 MG/DL — SIGNIFICANT CHANGE UP (ref 0.2–1)
WBC # BLD: 6.61 K/UL — SIGNIFICANT CHANGE UP (ref 3.8–10.5)
WBC # FLD AUTO: 6.61 K/UL — SIGNIFICANT CHANGE UP (ref 3.8–10.5)
WBC UR QL: 0 /HPF — SIGNIFICANT CHANGE UP (ref 0–5)

## 2024-09-18 PROCEDURE — 74176 CT ABD & PELVIS W/O CONTRAST: CPT | Mod: 26

## 2024-09-18 PROCEDURE — 99497 ADVNCD CARE PLAN 30 MIN: CPT | Mod: 25

## 2024-09-18 PROCEDURE — 71045 X-RAY EXAM CHEST 1 VIEW: CPT | Mod: 26

## 2024-09-18 PROCEDURE — 71250 CT THORAX DX C-: CPT | Mod: 26

## 2024-09-18 PROCEDURE — 99285 EMERGENCY DEPT VISIT HI MDM: CPT

## 2024-09-18 PROCEDURE — 99223 1ST HOSP IP/OBS HIGH 75: CPT

## 2024-09-18 RX ORDER — ACETAMINOPHEN 325 MG/1
650 TABLET ORAL EVERY 6 HOURS
Refills: 0 | Status: DISCONTINUED | OUTPATIENT
Start: 2024-09-18 | End: 2024-09-20

## 2024-09-18 RX ORDER — SODIUM CHLORIDE 9 MG/ML
1700 INJECTION INTRAMUSCULAR; INTRAVENOUS; SUBCUTANEOUS ONCE
Refills: 0 | Status: COMPLETED | OUTPATIENT
Start: 2024-09-18 | End: 2024-09-18

## 2024-09-18 RX ORDER — ONDANSETRON 2 MG/ML
4 INJECTION, SOLUTION INTRAMUSCULAR; INTRAVENOUS EVERY 8 HOURS
Refills: 0 | Status: DISCONTINUED | OUTPATIENT
Start: 2024-09-18 | End: 2024-09-20

## 2024-09-18 RX ORDER — ENOXAPARIN SODIUM 100 MG/ML
40 INJECTION SUBCUTANEOUS EVERY 24 HOURS
Refills: 0 | Status: DISCONTINUED | OUTPATIENT
Start: 2024-09-18 | End: 2024-09-18

## 2024-09-18 RX ORDER — MAGNESIUM, ALUMINUM HYDROXIDE 200-225/5
30 SUSPENSION, ORAL (FINAL DOSE FORM) ORAL EVERY 4 HOURS
Refills: 0 | Status: DISCONTINUED | OUTPATIENT
Start: 2024-09-18 | End: 2024-09-20

## 2024-09-18 RX ORDER — ENOXAPARIN SODIUM 100 MG/ML
30 INJECTION SUBCUTANEOUS EVERY 24 HOURS
Refills: 0 | Status: DISCONTINUED | OUTPATIENT
Start: 2024-09-18 | End: 2024-09-20

## 2024-09-18 RX ORDER — DEXAMETHASONE 0.75 MG
6 TABLET ORAL ONCE
Refills: 0 | Status: COMPLETED | OUTPATIENT
Start: 2024-09-18 | End: 2024-09-18

## 2024-09-18 RX ORDER — ACETAMINOPHEN 325 MG/1
1000 TABLET ORAL ONCE
Refills: 0 | Status: COMPLETED | OUTPATIENT
Start: 2024-09-18 | End: 2024-09-18

## 2024-09-18 RX ORDER — AZITHROMYCIN 500 MG/1
500 TABLET, FILM COATED ORAL ONCE
Refills: 0 | Status: COMPLETED | OUTPATIENT
Start: 2024-09-18 | End: 2024-09-18

## 2024-09-18 RX ADMIN — Medication 1000 MILLIGRAM(S): at 16:22

## 2024-09-18 RX ADMIN — ACETAMINOPHEN 400 MILLIGRAM(S): 325 TABLET ORAL at 16:22

## 2024-09-18 RX ADMIN — AZITHROMYCIN 255 MILLIGRAM(S): 500 TABLET, FILM COATED ORAL at 16:23

## 2024-09-18 RX ADMIN — Medication 6 MILLIGRAM(S): at 17:48

## 2024-09-18 RX ADMIN — SODIUM CHLORIDE 1700 MILLILITER(S): 9 INJECTION INTRAMUSCULAR; INTRAVENOUS; SUBCUTANEOUS at 16:22

## 2024-09-18 RX ADMIN — SODIUM CHLORIDE 1700 MILLILITER(S): 9 INJECTION INTRAMUSCULAR; INTRAVENOUS; SUBCUTANEOUS at 18:00

## 2024-09-18 NOTE — ED PROVIDER NOTE - CLINICAL SUMMARY MEDICAL DECISION MAKING FREE TEXT BOX
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 Daughter is at bedside, states pt was at a wedding on Sunday, developed symptoms yesterday morning, went to PCP today, tested covid+ and found to be febrile, sent pt into ED. Pt was last admitted here in May for a similar presentation, found to have lobar pneumonia 2/2 aspiration. Pt is typically fully independent, able to perform all ADLs, and walks without assistance. No headache, No ear pain/sore throat/dysphagia, No chest pain/palpitations, no SOB/wheeze/stridor, No abdominal pain, No N/V/D, no dysuria/frequency/discharge, No neck/back pain, no rash, no changes in neurological status/function. Not taking blood thinners.    General: Awake, alert, lying in bed, tired appearing  HEENT: Normocephalic, atraumatic. No scleral icterus or conjunctival injection. EOMI. Moist mucous membranes. Oropharynx clear.   Neck:. Soft and supple.  Cardiac: RRR, Peripheral pulses 2+ and symmetric. No LE edema.  Resp: Lungs CTAB. No accessory muscle use  Abd: Soft, non-tender, non-distended. No guarding, rebound, or rigidity.  Back: Spine midline and non-tender.   Skin: No rashes, abrasions, or lacerations.  Neuro: AO x 4. Moves all extremities symmetrically. Motor strength and sensation grossly intact.  Psych: Appropriate mood and affect     Likely pneumonia 2/2 covid. Found to be febrile, will obtain sepsis workup and give ofirmev, obtain CT noncon. Will reassess. Likely admission,.

## 2024-09-18 NOTE — H&P ADULT - NSHPLABSRESULTS_GEN_ALL_CORE
13.6   6.61  )-----------( 115      ( 18 Sep 2024 16:15 )             41.0     18 Sep 2024 16:15    133    |  97     |  21.3   ----------------------------<  112    4.7     |  22.0   |  1.27     Ca    9.1        18 Sep 2024 16:15    TPro  7.4    /  Alb  4.5    /  TBili  2.8    /  DBili  x      /  AST  27     /  ALT  16     /  AlkPhos  86     18 Sep 2024 16:15    PT/INR - ( 18 Sep 2024 16:15 )   PT: 11.7 sec;   INR: 1.06 ratio         PTT - ( 18 Sep 2024 16:15 )  PTT:28.2 sec  CAPILLARY BLOOD GLUCOSE        LIVER FUNCTIONS - ( 18 Sep 2024 16:15 )  Alb: 4.5 g/dL / Pro: 7.4 g/dL / ALK PHOS: 86 U/L / ALT: 16 U/L / AST: 27 U/L / GGT: x           Urinalysis Basic - ( 18 Sep 2024 16:15 )    Color: Yellow / Appearance: Clear / S.016 / pH: x  Gluc: 112 mg/dL / Ketone: Negative mg/dL  / Bili: Negative / Urobili: 1.0 mg/dL   Blood: x / Protein: 100 mg/dL / Nitrite: Negative   Leuk Esterase: Trace / RBC: 77 /HPF / WBC 0 /HPF   Sq Epi: x / Non Sq Epi: 0 /HPF / Bacteria: Negative /HPF      < from: CT Abdomen and Pelvis No Cont (24 @ 19:16) >    IMPRESSION:  No acute abnormality in the chest, abdomen and pelvis.    < end of copied text >

## 2024-09-18 NOTE — H&P ADULT - NSHPPHYSICALEXAM_GEN_ALL_CORE
Vital Signs Last 24 Hrs  T(C): 37.7 (18 Sep 2024 18:15), Max: 39.3 (18 Sep 2024 14:53)  T(F): 99.8 (18 Sep 2024 18:15), Max: 102.7 (18 Sep 2024 14:53)  HR: 63 (18 Sep 2024 18:34) (63 - 86)  BP: 118/53 (18 Sep 2024 18:34) (115/54 - 158/84)  BP(mean): --  RR: 18 (18 Sep 2024 18:34) (18 - 20)  SpO2: 99% (18 Sep 2024 18:34) (96% - 100%)    Parameters below as of 18 Sep 2024 18:34  Patient On (Oxygen Delivery Method): nasal cannula  O2 Flow (L/min): 2      General: Age-appearing, in no acute distress  Head: Normocephalic, atraumatic  ENMT: EOMI, neck supple  Cardiovascular: +S1, S2; Regular rate and rhythm, no murmurs, rubs, gallops  Respiratory: CTA BL, no wheezes, rales, rhonchi  Gastrointestinal: Abdomen soft, non-tender, +BS in all 4 quadrants  Extremities: No clubbing, cyanosis, or edema  Vascular: 2+ pulses, cap refill < 2 seconds  Neuro: Non-focal, AAOx4, sensation intact BL  Musculoskeletal: Normal tone, no deformities  Skin: Warm, dry; no acute rash seen  Psych: Appropriate, cooperative

## 2024-09-18 NOTE — ED PROVIDER NOTE - ATTENDING CONTRIBUTION TO CARE
I performed a history and physical exam of the patient and discussed their management with the resident. I reviewed the resident's note and agree with the documented findings and plan of care. My medical decision making and observations are found below.     89-year-old male with past medical history of  syncope status post permanent pacemaker, right mandibular bone cancer status post jaw resection and reconstruction, now in remission, presenting with weakness, fever, and cough.  Patient recently diagnosed with COVID, positive sick contacts.  Has history of aspiration pneumonia, states that he feels similar to that.  On exam, patient tired appearing, dry oral mucosa, lungs clear to auscultation bilaterally, however tachypneic,  SpO2 89% on room air.  Placed on 2 L nasal cannula.  Chest x-ray obtained which was clear.  Labs reviewed which were nonactionable.  Will give dexamethasone, admit to medicine for further management.  Consider remdesivir inpatient.

## 2024-09-18 NOTE — H&P ADULT - ASSESSMENT
89 yo male with pmhx jaw cancer s/p resection/chemo/radiation now in remission, BPH, Hypothyroidism presenting to the ED with weakness and failure to thrive 2/2 COVID.    Weakness, Failure to thrive, AHRF 2/2 COVID  -Admit to medicine with continuous pulse ox  -Supplemental O2, titrate to maintain SpO2 > 92%  -Vitals signs Q4 hours  -Decadron 6 mg IV daily x 10 days  -Remdesivir taper, Monitor Cr and LFTs on Remdesivir  -Albuterol PRN SOB, Tessalon perles PRN cough, Tylenol PRN fever/headache  -Lovenox for DVT ppx   -PT eval    Hypothyroidism  -Continue Synthroud 75 mcg daily    BPH  -Continue Tamsulosin 0.4mg qhs    DVTppx: Lovenox  GOC: Full Code  Dispo: Pending improvement of hypoxia, PT eval 89 yo male with pmhx jaw cancer s/p resection/chemo/radiation now in remission, BPH, Hypothyroidism presenting to the ED with weakness and failure to thrive 2/2 COVID.    Weakness, Failure to thrive, AHRF 2/2 COVID  -Admit to medicine with continuous pulse ox  -Supplemental O2, titrate to maintain SpO2 > 92%  -Vitals signs Q4 hours  -Decadron 6 mg IV daily x 10 days  -Remdesivir taper, Monitor Cr and LFTs on Remdesivir  -CT without evidnece of superimposed bacterial PNA, will not continue antibiotics  -Albuterol PRN SOB, Tessalon perles PRN cough, Tylenol PRN fever/headache  -Lovenox for DVT ppx   -PT eval    Hypothyroidism  -Continue Synthroud 75 mcg daily    BPH  -Continue Tamsulosin 0.4mg qhs    DVTppx: Lovenox  GOC: Full Code  Dispo: Pending improvement of hypoxia, PT eval

## 2024-09-18 NOTE — ED ADULT TRIAGE NOTE - CHIEF COMPLAINT QUOTE
presents to ed from md office, sent to be admitted for covid +, pt is weak, coughing and hx of aspiration pneumonia. pt is currently febrile

## 2024-09-18 NOTE — ED CLERICAL - DIVISION
Spoke with patient  We discussed the option of opting for a Mirena IUD which will help with better managing her cycles  She will schedule an appt for insertion  [facility]

## 2024-09-18 NOTE — H&P ADULT - CONVERSATION DETAILS
Discussed with daughter bedside. She states she is HCP. She states patient is very functional, self sufficient prior to this COVID infection. She is unsure if he would want to sign a DNR, but states she wanted to discuss with her sisters prior to making any decisions. I explained to her that in the interim if there was no signed documentation, he would be considered full code in which all interventions would be done including chest compressions and intubation. She agreed with this. She will discuss with sisters and they will update medical team if there are any changes to code status.

## 2024-09-18 NOTE — ED ADULT NURSE NOTE - NSFALLUNIVINTERV_ED_ALL_ED
Bed/Stretcher in lowest position, wheels locked, appropriate side rails in place/Call bell, personal items and telephone in reach/Instruct patient to call for assistance before getting out of bed/chair/stretcher/Non-slip footwear applied when patient is off stretcher/[place] to call system/Physically safe environment - no spills, clutter or unnecessary equipment/Purposeful proactive rounding/Room/bathroom lighting operational, light cord in reach

## 2024-09-18 NOTE — ED ADULT NURSE NOTE - CAS DISCH BELONGINGS RETURNED
Please notify patient that results are abnormal of her urine culture- is she completing the antibiotics I have sent? She still has E-coli infection; any symptoms?.I tried to call her twice today and number could not accept calls; I will need her to see urology and ID- referrals have been placed; I will need to treat her but need to do some looking into what she can have- let me know about her condition please if you can reach her  
Not applicable

## 2024-09-18 NOTE — ED ADULT NURSE NOTE - NS PRO PASSIVE SMOKE EXP
Speech Therapy      Visit Type: Treatment  -  Communication/cognition  Reason for consult: TBI    Relevant History/Co-morbidities: --  5/25/24:  MoCA 8.1 7/30 5/24/24:  Admit to IRP  5/19/24: left MMA embolization; intubated for procedure.  5/18/24 MoCA 4/30 5/17/24: Informal COM/COG evaluation; Clinical swallow evaluation; neurosurgery: left ruy holes possible craniotomy for subdural evacuation with EVD.   5/16/24: patient admitted for SDH; MRI remarkable for Redemonstrated prominent subacute left convexity subdural hematoma with significant mass effect including rightward subfalcine shift of 1.5 cm, medialization of left uncus and probable trapping of the right lateral ventricle with mild periventricular interstitial edema.        Pertinant medical hx: seizures, substance abuse      Prior speech hx:  6/12/18: VFSS    SUBJECTIVE  - Patient agreed to participate in therapy this date.    Patient seen in therapy department, alert and appropriate for session.          - Patient/family goals:  I want to go home  Pain at onset of session:   Patient reports pain is not an issue/concern.      OBJECTIVE                   ASSESSMENT  Impairments: auditory comprehension, verbal expression, reading comprehension, memory, attention/concentration, problem solving/executive function, orientation, motor speech/speech intelligibility, written expression and insight/awareness  Functional Limitations: communication/cognition and IADLs  Discharge Recommendations  SLP Identified Barriers to Discharge: none with support  Recommendation for Discharge Location: SLP [place]: Home with Home therapy  Recommendation for Discharge Support: SLP [place]: 24 Hour assist, Assistance with medication, Assistance with IADLs      Patient seen for com/cog therapy session targeting discharge education. Patient's sister present for session with patient approval. Discussed role of SLP, recommendations to continue speech therapy at next level of care, appropriate  level of supervision/ assist needed at discharge, and applicable cognitive and communication compensatory strategies. Patient and sister expressed agreement with all recommendations and understanding of all education. All questions answered. Handouts provided.    Patient demonstrating good progress as evidenced by improvements in MoCA score compared to initial administration 5/18, participation and motivation to improve. At this time. Patient continues to demonstrate moderate-severe overall impairments in the areas of auditory comprehension, verbal expression, reading comprehension, memory, attention/ concentration, problem solving/ executive function, orientation, motor speech/ speech intelligibility, written expression and insight/ awareness which limit the performance of communication/ cognition and IADLs. Patient is currently needing moderate-maximal cueing for communication/ cognition and IADLs.          Rehab Potential: good      Potential barriers to progress:  Reduced insight into deficits, cognitive status, severity of deficits and attention    Therapy Participation: This patient participated in all scheduled speech therapy time this session.    Education:   - Present and ready to learn: patient and patient's family  Education provided during session:  - role of SLP, cognition and communication  - Results of above outlined education: Needs reinforcement and Verbalizes understanding    Patient at end of session:    - location: in chair    - hand off to: family/caregiver and rehab aide/tech    PLAN  SLP Frequency: 60min 1/5 on 6/4 (5/28)  Duration: 6/4/2024      Plan: Need to clarify level of assist patient will have at discharge at this time she will need 24/7 and assist with iADLs given severity of cognitive communicative status. Patient reports has 11th grade education therefore [employer] not completed. Assess further for any neglect for linguistic stimuli (questioned during clock draw task). Continue  functional assessment for iADLs with initiation of memory journal to compensate for significant memory and orientation deficits. Patient has demonstrated reduced cooperation and motivation to participate with reduced insight. Would also clarify with someone who knows patient if her speech intelligibility is at baseline or not.  Interventions:  Communication/cognition therapy and patient/family education    Plan/Goal Agreement:  Patient agrees with goals and individualized plan of care and family/significant other/caregiver agrees      RECOMMENDATIONS     -Diet:          *Liquid- Thin and Regular (Pt to select softer items from menu)    -Medication Administration:         *whole and with liquids    -Feeding Guidelines:          *sit fully upright for all po intake, small bites/sips, feeds self and set up tray    -Communication Cognition:          *Patient continues to demonstrate acute communication and cognition deficits, will continue to follow.  Patient will require 24/7 supervision at discharge.  Recommend the provider order in home speech therapy at discharge.        GOALS  Review date: 6/1/2024  Short Term Goals: to be met 7 days from date established, unless otherwise stated.  - Patient will recall remote personal information with use of external aid 90% with independent cues.  - Patient will locate specific sections in memory book 90%  with independent cues.  - Patient will update new page in memory journal with correct date 90%  with independent cues.  - Patient will log relevant newly learned information in memory book or ask staff to write notes 80%  with minimal  cues.  - Patient will bring memory book/other indicated items to all therapy sessions 80%  with minimal  cues.    - Patient will locate hospital room 90%  with independent cues.   - Patient will recall newly learned basic information with appropriate use of notetaking or asking for clarifying questions 80% with minimal  cues.  - Patient will  identify that a problem exists in a picture/verbally presented situation 80% with minimal  cues.  - Patient will perform predict/perform for their performance on task within 70% with minimal  cues.  Long Term Goals: to be met by discharge from rehab program.  - Patient will demonstrate functional memory skills for linguistic information with minimal-moderate cues.  - Patient will demonstrate functional Problem Solving & Safety Awareness skills for linguistic information with minimal-moderate cues.    Documented in the chart in the following areas:    Assessment.        Therapy procedure time and total treatment time can be found documented on the Time Entry flowsheet   Unknown

## 2024-09-18 NOTE — ED ADULT NURSE NOTE - OBJECTIVE STATEMENT
Pt presents to the ED A&Ox4 sent in from MD. Pt reports being diagnosed with covid today. Pt c/o weakness, tiredness, and dry cough x a few days. Pt denies fever and chills. Pt reports PMH of BPH, jaw CA in 2019. Pt RR even and unlabored, NAD noted.

## 2024-09-19 LAB
ALBUMIN SERPL ELPH-MCNC: 3.8 G/DL — SIGNIFICANT CHANGE UP (ref 3.3–5.2)
ALP SERPL-CCNC: 74 U/L — SIGNIFICANT CHANGE UP (ref 40–120)
ALT FLD-CCNC: 15 U/L — SIGNIFICANT CHANGE UP
ANION GAP SERPL CALC-SCNC: 18 MMOL/L — HIGH (ref 5–17)
AST SERPL-CCNC: 31 U/L — SIGNIFICANT CHANGE UP
BASOPHILS # BLD AUTO: 0.01 K/UL — SIGNIFICANT CHANGE UP (ref 0–0.2)
BASOPHILS NFR BLD AUTO: 0.2 % — SIGNIFICANT CHANGE UP (ref 0–2)
BILIRUB DIRECT SERPL-MCNC: 0.3 MG/DL — SIGNIFICANT CHANGE UP (ref 0–0.3)
BILIRUB INDIRECT FLD-MCNC: 1.5 MG/DL — HIGH (ref 0.2–1)
BILIRUB SERPL-MCNC: 1.8 MG/DL — SIGNIFICANT CHANGE UP (ref 0.4–2)
BUN SERPL-MCNC: 22.3 MG/DL — HIGH (ref 8–20)
CALCIUM SERPL-MCNC: 8.6 MG/DL — SIGNIFICANT CHANGE UP (ref 8.4–10.5)
CHLORIDE SERPL-SCNC: 99 MMOL/L — SIGNIFICANT CHANGE UP (ref 96–108)
CO2 SERPL-SCNC: 18 MMOL/L — LOW (ref 22–29)
CREAT SERPL-MCNC: 1.08 MG/DL — SIGNIFICANT CHANGE UP (ref 0.5–1.3)
CULTURE RESULTS: SIGNIFICANT CHANGE UP
EGFR: 63 ML/MIN/1.73M2 — SIGNIFICANT CHANGE UP
EOSINOPHIL # BLD AUTO: 0 K/UL — SIGNIFICANT CHANGE UP (ref 0–0.5)
EOSINOPHIL NFR BLD AUTO: 0 % — SIGNIFICANT CHANGE UP (ref 0–6)
GLUCOSE SERPL-MCNC: 135 MG/DL — HIGH (ref 70–99)
HCT VFR BLD CALC: 39.2 % — SIGNIFICANT CHANGE UP (ref 39–50)
HGB BLD-MCNC: 13.1 G/DL — SIGNIFICANT CHANGE UP (ref 13–17)
IMM GRANULOCYTES NFR BLD AUTO: 0.4 % — SIGNIFICANT CHANGE UP (ref 0–0.9)
INR BLD: 1.13 RATIO — SIGNIFICANT CHANGE UP (ref 0.85–1.18)
LYMPHOCYTES # BLD AUTO: 0.51 K/UL — LOW (ref 1–3.3)
LYMPHOCYTES # BLD AUTO: 11.3 % — LOW (ref 13–44)
MCHC RBC-ENTMCNC: 29.5 PG — SIGNIFICANT CHANGE UP (ref 27–34)
MCHC RBC-ENTMCNC: 33.4 GM/DL — SIGNIFICANT CHANGE UP (ref 32–36)
MCV RBC AUTO: 88.3 FL — SIGNIFICANT CHANGE UP (ref 80–100)
MONOCYTES # BLD AUTO: 0.24 K/UL — SIGNIFICANT CHANGE UP (ref 0–0.9)
MONOCYTES NFR BLD AUTO: 5.3 % — SIGNIFICANT CHANGE UP (ref 2–14)
NEUTROPHILS # BLD AUTO: 3.75 K/UL — SIGNIFICANT CHANGE UP (ref 1.8–7.4)
NEUTROPHILS NFR BLD AUTO: 82.8 % — HIGH (ref 43–77)
PLATELET # BLD AUTO: 116 K/UL — LOW (ref 150–400)
POTASSIUM SERPL-MCNC: 5.1 MMOL/L — SIGNIFICANT CHANGE UP (ref 3.5–5.3)
POTASSIUM SERPL-SCNC: 5.1 MMOL/L — SIGNIFICANT CHANGE UP (ref 3.5–5.3)
PROT SERPL-MCNC: 6.7 G/DL — SIGNIFICANT CHANGE UP (ref 6.6–8.7)
PROTHROM AB SERPL-ACNC: 12.5 SEC — SIGNIFICANT CHANGE UP (ref 9.5–13)
RBC # BLD: 4.44 M/UL — SIGNIFICANT CHANGE UP (ref 4.2–5.8)
RBC # FLD: 14.2 % — SIGNIFICANT CHANGE UP (ref 10.3–14.5)
SODIUM SERPL-SCNC: 135 MMOL/L — SIGNIFICANT CHANGE UP (ref 135–145)
SPECIMEN SOURCE: SIGNIFICANT CHANGE UP
WBC # BLD: 4.53 K/UL — SIGNIFICANT CHANGE UP (ref 3.8–10.5)
WBC # FLD AUTO: 4.53 K/UL — SIGNIFICANT CHANGE UP (ref 3.8–10.5)

## 2024-09-19 PROCEDURE — 93010 ELECTROCARDIOGRAM REPORT: CPT

## 2024-09-19 PROCEDURE — 99232 SBSQ HOSP IP/OBS MODERATE 35: CPT

## 2024-09-19 RX ORDER — DEXAMETHASONE 0.75 MG
6 TABLET ORAL DAILY
Refills: 0 | Status: DISCONTINUED | OUTPATIENT
Start: 2024-09-19 | End: 2024-09-19

## 2024-09-19 RX ORDER — REMDESIVIR 5 MG/ML
100 INJECTION INTRAVENOUS EVERY 24 HOURS
Refills: 0 | Status: DISCONTINUED | OUTPATIENT
Start: 2024-09-20 | End: 2024-09-20

## 2024-09-19 RX ORDER — LEVOTHYROXINE SODIUM 100 MCG
75 TABLET ORAL DAILY
Refills: 0 | Status: DISCONTINUED | OUTPATIENT
Start: 2024-09-19 | End: 2024-09-20

## 2024-09-19 RX ORDER — CHLORHEXIDINE GLUCONATE 40 MG/ML
1 SOLUTION TOPICAL
Refills: 0 | Status: DISCONTINUED | OUTPATIENT
Start: 2024-09-19 | End: 2024-09-20

## 2024-09-19 RX ORDER — TAMSULOSIN HYDROCHLORIDE 0.4 MG/1
0.4 CAPSULE ORAL AT BEDTIME
Refills: 0 | Status: DISCONTINUED | OUTPATIENT
Start: 2024-09-19 | End: 2024-09-20

## 2024-09-19 RX ORDER — REMDESIVIR 5 MG/ML
200 INJECTION INTRAVENOUS EVERY 24 HOURS
Refills: 0 | Status: COMPLETED | OUTPATIENT
Start: 2024-09-19 | End: 2024-09-19

## 2024-09-19 RX ORDER — REMDESIVIR 5 MG/ML
INJECTION INTRAVENOUS
Refills: 0 | Status: DISCONTINUED | OUTPATIENT
Start: 2024-09-19 | End: 2024-09-20

## 2024-09-19 RX ADMIN — Medication 6 MILLIGRAM(S): at 06:10

## 2024-09-19 RX ADMIN — Medication 75 MICROGRAM(S): at 06:09

## 2024-09-19 RX ADMIN — TAMSULOSIN HYDROCHLORIDE 0.4 MILLIGRAM(S): 0.4 CAPSULE ORAL at 23:13

## 2024-09-19 RX ADMIN — REMDESIVIR 200 MILLIGRAM(S): 5 INJECTION INTRAVENOUS at 06:10

## 2024-09-19 RX ADMIN — ENOXAPARIN SODIUM 30 MILLIGRAM(S): 100 INJECTION SUBCUTANEOUS at 06:10

## 2024-09-19 NOTE — ED ADULT NURSE REASSESSMENT NOTE - NS ED NURSE REASSESS COMMENT FT1
A 2 RN skin check has been performed on admission to CDU with RN witness Concepcion.  A pressure injury was identified.  Documentation in the assessment to support findings.

## 2024-09-19 NOTE — ED ADULT NURSE REASSESSMENT NOTE - NS ED NURSE REASSESS COMMENT FT1
Assumed care of patient at this time. Patient a&ox4, no acute distress, resp nonlabored, resting comfortably in bed. Pt has no complaints at this time. Denies headache, dizziness, chest pain, palpitations, SOB, fevers, chills, weakness at this time. Patient awaiting admission bed. Safety maintained with bed locked and in lowest position. Pt remains on continuos cardiac monitoring NSR HR 64,  98% RA.

## 2024-09-19 NOTE — PATIENT PROFILE ADULT - PATIENT REPRESENTATIVE: ( YOU CAN CHOOSE ANY PERSON THAT CAN ASSIST YOU WITH YOUR HEALTH CARE PREFERENCES, DOES NOT HAVE TO BE A SPOUSE, IMMEDIATE FAMILY OR SIGNIFICANT OTHER/PARTNER)
Refill Approved    Rx renewed per Medication Renewal Policy. Medication was last renewed on 3/18/19.    Nadeen Mendoza, [facility] Triage/Med Refill 4/23/2019     Requested Prescriptions   Pending Prescriptions Disp Refills     sertraline (ZOLOFT) 25 MG tablet [Pharmacy Med Name: SERTRALINE 25MG TABLETS] 30 tablet 0     Sig: TAKE 1 TABLET BY MOUTH DAILY       SS[facility] Refill Protocol  Passed - 4/21/2019 12:52 PM        Passed - PCP or prescribing provider visit in last year     Last office visit with prescriber/PCP: Visit date not found OR same dept: 9/26/2018 Harleen Rivera MD OR same specialty: 9/26/2018 Harleen Rivera MD  Last physical: Visit date not found Last MTM visit: Visit date not found   Next visit within 3 mo: Visit date not found  Next physical within 3 mo: Visit date not found  Prescriber OR PCP: Dayami Mcelroy MD  Last diagnosis associated with med order: 1. Anxiety  - sertraline (ZOLOFT) 25 MG tablet [Pharmacy Med Name: SERTRALINE 25MG TABLETS]; TAKE 1 TABLET BY MOUTH DAILY  Dispense: 30 tablet; Refill: 0    If protocol passes may refill for 12 months if within 3 months of last provider visit (or a total of 15 months).                          yes

## 2024-09-19 NOTE — PATIENT PROFILE ADULT - FALL HARM RISK - HARM RISK INTERVENTIONS

## 2024-09-19 NOTE — PHYSICAL THERAPY INITIAL EVALUATION ADULT - PERTINENT HX OF CURRENT PROBLEM, REHAB EVAL
89 yo male with pmhx jaw cancer s/p resection/chemo/radiation now in remission, BPH, Hypothyroidism presenting to the ED with weakness and failure to thrive. Histroy mostly provided by daughter, patient has mild dementia AAOx2 at baseline. Patient was at a wedding 4 days ago and soon after developed weakness, cough, and shortness of breath. His daughter took him to his primary care provider yesterday for these symptoms and he was found to be COVID positive. Today, he was more weak, with poor PO intake and primary care physician instructed the daughter to bring patient to the ED. Patient lives alone and is generally self sufficient, has an aid that comes in for a few hours each day.

## 2024-09-19 NOTE — PHYSICAL THERAPY INITIAL EVALUATION ADULT - PREDICTED DURATION OF THERAPY (DAYS/WKS), PT EVAL
pt independent with all mobility at this time and appears at baseline. no skilled needs. will not follow.

## 2024-09-19 NOTE — PROGRESS NOTE ADULT - ASSESSMENT
89 yo male with pmhx jaw cancer s/p resection/chemo/radiation now in remission, BPH, Hypothyroidism presenting to the ED with weakness and failure to thrive 2/2 COVID.    Weakness, Failure to thrive, AHRF 2/2 COVID  -Admit to medicine with continuous pulse ox  -Supplemental O2, titrate to maintain SpO2 > 92%, now on room air   -Vitals signs Q4 hours  -will d/c Decadron and will lang Remdesivir to total, since he is off O2,  Monitor Cr and LFTs on Remdesivir  -CT without evidnece of superimposed bacterial PNA, will not continue antibiotics  -Albuterol PRN SOB, Tessalon perles PRN cough, Tylenol PRN fever/headache  -Lovenox for DVT ppx   -PT eval    Hypothyroidism:   -Continue Synthroid 75 mcg daily    BPH  -Continue Tamsulosin 0.4mg qhs    DVTppx: Lovenox  GOC: Full Code    Dispo: Pending finish 3 days of remdesivir , PT eval

## 2024-09-19 NOTE — PROGRESS NOTE ADULT - SUBJECTIVE AND OBJECTIVE BOX
KARISSA OCONNELL    [id number]    89y      Male    Patient is a 89y old  Male who presents with a chief complaint of Weakness and failure to thrive 2/2 COVID  Acute hypoxic respiratory failure (18 Sep 2024 21:24)      INTERVAL HPI/OVERNIGHT EVENTS:    Patient is better, sob is improving, denies fever, chills, chest pain, dizziness       Vital Signs Last 24 Hrs  T(C): 36.4 (19 Sep 2024 11:43), Max: 37.7 (18 Sep 2024 18:15)  T(F): 97.5 (19 Sep 2024 11:43), Max: 99.8 (18 Sep 2024 18:15)  HR: 62 (19 Sep 2024 11:43) (62 - 73)  BP: 124/66 (19 Sep 2024 11:43) (115/54 - 143/70)  RR: 18 (19 Sep 2024 11:43) (18 - 18)  SpO2: 98% (19 Sep 2024 11:43) (98% - 100%)    Parameters below as of 19 Sep 2024 11:43  Patient On (Oxygen Delivery Method): room air        PHYSICAL EXAM:    GENERAL: Elderly male looking comfortable   HEENT: atraumatic   NECK: soft, Supple, No JVD  CHEST/LUNG: Decrease air entry bilaterally; No wheezing  HEART: S1S2+, Regular rate and rhythm; No murmurs  ABDOMEN: Soft, Nontender, Nondistended; Bowel sounds present  EXTREMITIES:  1+ Peripheral Pulses, No edema  SKIN: No rashes or lesions  NEURO: AAOX3  PSYCH: normal mood      LABS:                        13.1   4.53  )-----------( 116      ( 19 Sep 2024 06:14 )             39.2     09-19    135  |  99  |  22.3[H]  ----------------------------<  135[H]  5.1   |  18.0[L]  |  1.08    Ca    8.6      19 Sep 2024 06:14    TPro  6.7  /  Alb  3.8  /  TBili  1.8  /  DBili  0.3  /  AST  31  /  ALT  15  /  AlkPhos  74  09-19    PT/INR - ( 19 Sep 2024 06:14 )   PT: 12.5 sec;   INR: 1.13 ratio         PTT - ( 18 Sep 2024 16:15 )  PTT:28.2 sec          I&O's Summary      MEDICATIONS  (STANDING):  chlorhexidine 2% Cloths 1 Application(s) Topical <User Schedule>  dexAMETHasone  Injectable 6 milliGRAM(s) IV Push daily  enoxaparin Injectable 30 milliGRAM(s) SubCutaneous every 24 hours  levothyroxine 75 MICROGram(s) Oral daily  remdesivir  IVPB   IV Intermittent   tamsulosin 0.4 milliGRAM(s) Oral at bedtime    MEDICATIONS  (PRN):  acetaminophen     Tablet .. 650 milliGRAM(s) Oral every 6 hours PRN Temp greater or equal to 38C (100.4F), Mild Pain (1 - 3)  aluminum hydroxide/magnesium hydroxide/simethicone Suspension 30 milliLiter(s) Oral every 4 hours PRN Dyspepsia  melatonin 3 milliGRAM(s) Oral at bedtime PRN Insomnia  ondansetron Injectable 4 milliGRAM(s) IV Push every 8 hours PRN Nausea and/or Vomiting

## 2024-09-20 ENCOUNTER — TRANSCRIPTION ENCOUNTER (OUTPATIENT)
Age: 89
End: 2024-09-20

## 2024-09-20 VITALS
SYSTOLIC BLOOD PRESSURE: 134 MMHG | OXYGEN SATURATION: 97 % | RESPIRATION RATE: 18 BRPM | HEART RATE: 66 BPM | DIASTOLIC BLOOD PRESSURE: 67 MMHG | TEMPERATURE: 98 F

## 2024-09-20 LAB
ALBUMIN SERPL ELPH-MCNC: 3.4 G/DL — SIGNIFICANT CHANGE UP (ref 3.3–5.2)
ALBUMIN SERPL ELPH-MCNC: 3.5 G/DL — SIGNIFICANT CHANGE UP (ref 3.3–5.2)
ALP SERPL-CCNC: 60 U/L — SIGNIFICANT CHANGE UP (ref 40–120)
ALP SERPL-CCNC: 60 U/L — SIGNIFICANT CHANGE UP (ref 40–120)
ALT FLD-CCNC: 14 U/L — SIGNIFICANT CHANGE UP
ALT FLD-CCNC: 14 U/L — SIGNIFICANT CHANGE UP
ANION GAP SERPL CALC-SCNC: 12 MMOL/L — SIGNIFICANT CHANGE UP (ref 5–17)
AST SERPL-CCNC: 34 U/L — SIGNIFICANT CHANGE UP
AST SERPL-CCNC: 35 U/L — SIGNIFICANT CHANGE UP
BILIRUB DIRECT SERPL-MCNC: 0.2 MG/DL — SIGNIFICANT CHANGE UP (ref 0–0.3)
BILIRUB INDIRECT FLD-MCNC: 0.8 MG/DL — SIGNIFICANT CHANGE UP (ref 0.2–1)
BILIRUB SERPL-MCNC: 1 MG/DL — SIGNIFICANT CHANGE UP (ref 0.4–2)
BILIRUB SERPL-MCNC: 1.1 MG/DL — SIGNIFICANT CHANGE UP (ref 0.4–2)
BUN SERPL-MCNC: 32.3 MG/DL — HIGH (ref 8–20)
CALCIUM SERPL-MCNC: 8.6 MG/DL — SIGNIFICANT CHANGE UP (ref 8.4–10.5)
CHLORIDE SERPL-SCNC: 105 MMOL/L — SIGNIFICANT CHANGE UP (ref 96–108)
CO2 SERPL-SCNC: 20 MMOL/L — LOW (ref 22–29)
CREAT SERPL-MCNC: 1.17 MG/DL — SIGNIFICANT CHANGE UP (ref 0.5–1.3)
CREAT SERPL-MCNC: 1.18 MG/DL — SIGNIFICANT CHANGE UP (ref 0.5–1.3)
EGFR: 56 ML/MIN/1.73M2 — LOW
EGFR: 57 ML/MIN/1.73M2 — LOW
GLUCOSE SERPL-MCNC: 92 MG/DL — SIGNIFICANT CHANGE UP (ref 70–99)
HCT VFR BLD CALC: 38.7 % — LOW (ref 39–50)
HGB BLD-MCNC: 13 G/DL — SIGNIFICANT CHANGE UP (ref 13–17)
INR BLD: 1.08 RATIO — SIGNIFICANT CHANGE UP (ref 0.85–1.18)
MCHC RBC-ENTMCNC: 29.5 PG — SIGNIFICANT CHANGE UP (ref 27–34)
MCHC RBC-ENTMCNC: 33.6 GM/DL — SIGNIFICANT CHANGE UP (ref 32–36)
MCV RBC AUTO: 87.8 FL — SIGNIFICANT CHANGE UP (ref 80–100)
PLATELET # BLD AUTO: 122 K/UL — LOW (ref 150–400)
POTASSIUM SERPL-MCNC: 4.5 MMOL/L — SIGNIFICANT CHANGE UP (ref 3.5–5.3)
POTASSIUM SERPL-SCNC: 4.5 MMOL/L — SIGNIFICANT CHANGE UP (ref 3.5–5.3)
PROT SERPL-MCNC: 6.1 G/DL — LOW (ref 6.6–8.7)
PROT SERPL-MCNC: 6.1 G/DL — LOW (ref 6.6–8.7)
PROTHROM AB SERPL-ACNC: 12 SEC — SIGNIFICANT CHANGE UP (ref 9.5–13)
RBC # BLD: 4.41 M/UL — SIGNIFICANT CHANGE UP (ref 4.2–5.8)
RBC # FLD: 14.5 % — SIGNIFICANT CHANGE UP (ref 10.3–14.5)
SODIUM SERPL-SCNC: 137 MMOL/L — SIGNIFICANT CHANGE UP (ref 135–145)
WBC # BLD: 8.34 K/UL — SIGNIFICANT CHANGE UP (ref 3.8–10.5)
WBC # FLD AUTO: 8.34 K/UL — SIGNIFICANT CHANGE UP (ref 3.8–10.5)

## 2024-09-20 PROCEDURE — 99239 HOSP IP/OBS DSCHRG MGMT >30: CPT

## 2024-09-20 RX ORDER — OLANZAPINE 7.5 MG/1
2.5 TABLET ORAL ONCE
Refills: 0 | Status: DISCONTINUED | OUTPATIENT
Start: 2024-09-20 | End: 2024-09-20

## 2024-09-20 RX ORDER — OLANZAPINE 7.5 MG/1
5 TABLET ORAL ONCE
Refills: 0 | Status: DISCONTINUED | OUTPATIENT
Start: 2024-09-20 | End: 2024-09-20

## 2024-09-20 RX ADMIN — Medication 75 MICROGRAM(S): at 05:51

## 2024-09-20 RX ADMIN — REMDESIVIR 200 MILLIGRAM(S): 5 INJECTION INTRAVENOUS at 05:51

## 2024-09-20 RX ADMIN — ENOXAPARIN SODIUM 30 MILLIGRAM(S): 100 INJECTION SUBCUTANEOUS at 05:51

## 2024-09-20 RX ADMIN — CHLORHEXIDINE GLUCONATE 1 APPLICATION(S): 40 SOLUTION TOPICAL at 06:06

## 2024-09-20 NOTE — DISCHARGE NOTE NURSING/CASE MANAGEMENT/SOCIAL WORK - NSDCPEFALRISK_GEN_ALL_CORE
For information on Fall & Injury Prevention, visit: [email or web address] OR  [email or web address] OR  https://www.cdc.gov/steadi/patient.html

## 2024-09-20 NOTE — DISCHARGE NOTE PROVIDER - NSDCMRMEDTOKEN_GEN_ALL_CORE_FT
levothyroxine 75 mcg (0.075 mg) oral tablet: 1 tab(s) orally once a day  tamsulosin 0.4 mg oral capsule: 1 cap(s) orally once a day

## 2024-09-20 NOTE — DISCHARGE NOTE PROVIDER - HOSPITAL COURSE
89 yo male with pmhx jaw cancer s/p resection/chemo/radiation now in remission, BPH, Hypothyroidism presenting to the ED with weakness and failure to thrive 2/2 COVID. Pt admitted to [facility] for weakness and failure to thrive. Pt also with AHRF 2/2 COVID-19. Pt was initiated on dexamethasone and remdesivir with improvement. Pt on RA and has since improved. Pt is now medically optimized and medically stable for discharge with appropriate outpatient follow up.     All electrolyte abnormalities were monitored carefully and repleted as necessary during this hospitalization. At the time of discharge patient was hemodynamically stable and amenable to all terms of discharge.    89 yo male with pmhx jaw cancer s/p resection/chemo/radiation now in remission, BPH, Hypothyroidism presenting to the ED with weakness and failure to thrive 2/2 COVID. Pt admitted to [facility] for weakness and failure to thrive. Pt also with AHRF 2/2 COVID-19. Pt was initiated on dexamethasone and remdesivir with improvement. Pt on RA and has since improved. Pt is now medically optimized and medically stable for discharge with appropriate outpatient follow up.     All electrolyte abnormalities were monitored carefully and repleted as necessary during this hospitalization. At the time of discharge patient was hemodynamically stable and amenable to all terms of discharge.

## 2024-09-20 NOTE — DISCHARGE NOTE NURSING/CASE MANAGEMENT/SOCIAL WORK - PATIENT PORTAL LINK FT
You can access the FollowMyHealth Patient Portal offered by [facility] by registering at the following website: [email or web address]. By joining [facility] [facility]’s FollowMyHealth portal, you will also be able to view your health information using other applications (apps) compatible with our system.

## 2024-09-20 NOTE — DISCHARGE NOTE PROVIDER - ATTENDING DISCHARGE PHYSICAL EXAMINATION:
GENERAL: Elderly male looking comfortable   HEENT: atraumatic   NECK: soft, Supple, No JVD  CHEST/LUNG: CTAB; No wheezing  HEART: S1S2+, Regular rate and rhythm; No murmurs  ABDOMEN: Soft, Nontender, Nondistended; Bowel sounds present  EXTREMITIES:  1+ Peripheral Pulses, No edema  SKIN: No rashes or lesions  NEURO: AAOX3  PSYCH: normal mood
no

## 2024-09-20 NOTE — DISCHARGE NOTE NURSING/CASE MANAGEMENT/SOCIAL WORK - NSDCVIVACCINE_GEN_ALL_CORE_FT
No Vaccines Administered. Rinvoq Pregnancy And Lactation Text: Based on animal studies, Rinvoq may cause embryo-fetal harm when administered to pregnant women.  The medication should not be used in pregnancy.  Breastfeeding is not recommended during treatment and for 6 days after the last dose.

## 2024-09-20 NOTE — DISCHARGE NOTE PROVIDER - NSDCCPCAREPLAN_GEN_ALL_CORE_FT
PRINCIPAL DISCHARGE DIAGNOSIS  Diagnosis: Adult failure to thrive  Assessment and Plan of Treatment: - Improving  - Encourage oral intake  - Follow up with PCP in 1 week      SECONDARY DISCHARGE DIAGNOSES  Diagnosis: COVID  Assessment and Plan of Treatment: - Supportive care  - Follow up with PCP in 1 week

## 2024-09-23 LAB
CULTURE RESULTS: SIGNIFICANT CHANGE UP
SPECIMEN SOURCE: SIGNIFICANT CHANGE UP

## 2024-10-15 PROCEDURE — 85025 COMPLETE CBC W/AUTO DIFF WBC: CPT

## 2024-10-15 PROCEDURE — 93005 ELECTROCARDIOGRAM TRACING: CPT

## 2024-10-15 PROCEDURE — 82947 ASSAY GLUCOSE BLOOD QUANT: CPT

## 2024-10-15 PROCEDURE — 87040 BLOOD CULTURE FOR BACTERIA: CPT

## 2024-10-15 PROCEDURE — 36415 COLL VENOUS BLD VENIPUNCTURE: CPT

## 2024-10-15 PROCEDURE — 0225U NFCT DS DNA&RNA 21 SARSCOV2: CPT

## 2024-10-15 PROCEDURE — 85027 COMPLETE CBC AUTOMATED: CPT

## 2024-10-15 PROCEDURE — 85730 THROMBOPLASTIN TIME PARTIAL: CPT

## 2024-10-15 PROCEDURE — 80048 BASIC METABOLIC PNL TOTAL CA: CPT

## 2024-10-15 PROCEDURE — 82803 BLOOD GASES ANY COMBINATION: CPT

## 2024-10-15 PROCEDURE — 82565 ASSAY OF CREATININE: CPT

## 2024-10-15 PROCEDURE — 96375 TX/PRO/DX INJ NEW DRUG ADDON: CPT

## 2024-10-15 PROCEDURE — 99285 EMERGENCY DEPT VISIT HI MDM: CPT

## 2024-10-15 PROCEDURE — 84132 ASSAY OF SERUM POTASSIUM: CPT

## 2024-10-15 PROCEDURE — 80076 HEPATIC FUNCTION PANEL: CPT

## 2024-10-15 PROCEDURE — 85610 PROTHROMBIN TIME: CPT

## 2024-10-15 PROCEDURE — 85014 HEMATOCRIT: CPT

## 2024-10-15 PROCEDURE — 83605 ASSAY OF LACTIC ACID: CPT

## 2024-10-15 PROCEDURE — 71045 X-RAY EXAM CHEST 1 VIEW: CPT

## 2024-10-15 PROCEDURE — 97163 PT EVAL HIGH COMPLEX 45 MIN: CPT

## 2024-10-15 PROCEDURE — 80053 COMPREHEN METABOLIC PANEL: CPT

## 2024-10-15 PROCEDURE — 74176 CT ABD & PELVIS W/O CONTRAST: CPT | Mod: MC

## 2024-10-15 PROCEDURE — 84295 ASSAY OF SERUM SODIUM: CPT

## 2024-10-15 PROCEDURE — 87086 URINE CULTURE/COLONY COUNT: CPT

## 2024-10-15 PROCEDURE — 85018 HEMOGLOBIN: CPT

## 2024-10-15 PROCEDURE — 82330 ASSAY OF CALCIUM: CPT

## 2024-10-15 PROCEDURE — 82435 ASSAY OF BLOOD CHLORIDE: CPT

## 2024-10-15 PROCEDURE — 96374 THER/PROPH/DIAG INJ IV PUSH: CPT

## 2024-10-15 PROCEDURE — 71250 CT THORAX DX C-: CPT | Mod: MC

## 2024-10-15 PROCEDURE — 81001 URINALYSIS AUTO W/SCOPE: CPT

## 2025-04-15 NOTE — ED ADULT NURSE NOTE - NS_SISCREENINGSR_GEN_ALL_ED
Specialty notes reviewed. He had imaging done early this year that showed no metastatic disease or recurrence of previous disease.          Negative

## 2025-08-14 ENCOUNTER — OFFICE (OUTPATIENT)
Dept: URBAN - METROPOLITAN AREA CLINIC 113 | Facility: CLINIC | Age: OVER 89
Setting detail: OPHTHALMOLOGY
End: 2025-08-14
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
## 2025-08-14 PROCEDURE — NO SHOW FE NO SHOW FEE: Performed by: OPHTHALMOLOGY

## 2025-09-08 ENCOUNTER — RESULT REVIEW (OUTPATIENT)
Age: OVER 89
End: 2025-09-08

## 2025-09-11 ENCOUNTER — RESULT REVIEW (OUTPATIENT)
Age: OVER 89
End: 2025-09-11

## 2025-09-11 ENCOUNTER — TRANSCRIPTION ENCOUNTER (OUTPATIENT)
Age: OVER 89
End: 2025-09-11

## 2025-09-12 ENCOUNTER — TRANSCRIPTION ENCOUNTER (OUTPATIENT)
Age: OVER 89
End: 2025-09-12